# Patient Record
Sex: FEMALE | Race: WHITE | NOT HISPANIC OR LATINO | Employment: OTHER
[De-identification: names, ages, dates, MRNs, and addresses within clinical notes are randomized per-mention and may not be internally consistent; named-entity substitution may affect disease eponyms.]

---

## 2018-11-07 ENCOUNTER — TRANSCRIBE ORDERS (OUTPATIENT)
Dept: SCHEDULING | Age: 68
End: 2018-11-07

## 2018-11-07 DIAGNOSIS — M48.061 SPINAL STENOSIS OF LUMBAR REGION WITHOUT NEUROGENIC CLAUDICATION: Primary | ICD-10-CM

## 2018-11-07 DIAGNOSIS — M54.17 RADICULOPATHY OF LUMBOSACRAL REGION: ICD-10-CM

## 2018-11-20 ENCOUNTER — HOSPITAL ENCOUNTER (OUTPATIENT)
Dept: RADIOLOGY | Age: 68
Discharge: HOME | End: 2018-11-20
Attending: PHYSICAL MEDICINE & REHABILITATION
Payer: MEDICARE

## 2018-11-20 DIAGNOSIS — M48.061 SPINAL STENOSIS OF LUMBAR REGION WITHOUT NEUROGENIC CLAUDICATION: ICD-10-CM

## 2018-11-20 DIAGNOSIS — M54.17 RADICULOPATHY OF LUMBOSACRAL REGION: ICD-10-CM

## 2020-12-01 ENCOUNTER — OFFICE VISIT (OUTPATIENT)
Dept: INTERNAL MEDICINE | Facility: CLINIC | Age: 70
End: 2020-12-01
Payer: MEDICARE

## 2020-12-01 VITALS
DIASTOLIC BLOOD PRESSURE: 78 MMHG | SYSTOLIC BLOOD PRESSURE: 134 MMHG | HEIGHT: 61 IN | RESPIRATION RATE: 16 BRPM | OXYGEN SATURATION: 99 % | BODY MASS INDEX: 21.9 KG/M2 | TEMPERATURE: 97.5 F | HEART RATE: 89 BPM | WEIGHT: 116 LBS

## 2020-12-01 DIAGNOSIS — E11.9 TYPE 2 DIABETES MELLITUS WITHOUT COMPLICATION, WITHOUT LONG-TERM CURRENT USE OF INSULIN (CMS/HCC): Primary | ICD-10-CM

## 2020-12-01 DIAGNOSIS — H10.13 ALLERGIC CONJUNCTIVITIS OF BOTH EYES: ICD-10-CM

## 2020-12-01 DIAGNOSIS — Z86.0100 HX OF COLONIC POLYPS: ICD-10-CM

## 2020-12-01 DIAGNOSIS — Z11.59 SCREENING FOR VIRAL DISEASE: ICD-10-CM

## 2020-12-01 DIAGNOSIS — E78.5 HYPERLIPIDEMIA, UNSPECIFIED HYPERLIPIDEMIA TYPE: ICD-10-CM

## 2020-12-01 DIAGNOSIS — Z12.31 ENCOUNTER FOR SCREENING MAMMOGRAM FOR MALIGNANT NEOPLASM OF BREAST: ICD-10-CM

## 2020-12-01 DIAGNOSIS — E55.9 VITAMIN D DEFICIENCY: ICD-10-CM

## 2020-12-01 PROBLEM — Z90.89 HX OF TONSILLECTOMY: Status: ACTIVE | Noted: 2020-12-01

## 2020-12-01 PROBLEM — J30.2 SEASONAL ALLERGIES: Status: ACTIVE | Noted: 2020-12-01

## 2020-12-01 PROBLEM — M17.11 OSTEOARTHRITIS OF RIGHT KNEE: Status: ACTIVE | Noted: 2020-12-01

## 2020-12-01 PROBLEM — Z90.711 HISTORY OF PARTIAL HYSTERECTOMY: Status: ACTIVE | Noted: 2020-12-01

## 2020-12-01 PROBLEM — Z90.49 HX OF APPENDECTOMY: Status: ACTIVE | Noted: 2020-12-01

## 2020-12-01 PROCEDURE — 99204 OFFICE O/P NEW MOD 45 MIN: CPT | Performed by: INTERNAL MEDICINE

## 2020-12-01 RX ORDER — OLOPATADINE HYDROCHLORIDE 1 MG/ML
1 SOLUTION/ DROPS OPHTHALMIC 2 TIMES DAILY
Qty: 5 ML | Refills: 5 | Status: SHIPPED | OUTPATIENT
Start: 2020-12-01 | End: 2021-04-13

## 2020-12-01 RX ORDER — METFORMIN HYDROCHLORIDE 500 MG/1
500 TABLET, EXTENDED RELEASE ORAL
Qty: 90 TABLET | Refills: 1 | Status: SHIPPED | OUTPATIENT
Start: 2020-12-01 | End: 2021-02-23 | Stop reason: SDUPTHER

## 2020-12-01 RX ORDER — DICLOFENAC SODIUM 10 MG/G
GEL TOPICAL AS NEEDED
COMMUNITY

## 2020-12-01 RX ORDER — GLIMEPIRIDE 1 MG/1
1 TABLET ORAL DAILY
COMMUNITY
Start: 2020-09-30 | End: 2020-12-01

## 2020-12-01 ASSESSMENT — ENCOUNTER SYMPTOMS
CHILLS: 0
SHORTNESS OF BREATH: 0
HEMATURIA: 0
COUGH: 0
ABDOMINAL PAIN: 0
HEADACHES: 0
SINUS PRESSURE: 0
BACK PAIN: 0
BLOOD IN STOOL: 0
ADENOPATHY: 0
EYE PAIN: 0
SORE THROAT: 0
UNEXPECTED WEIGHT CHANGE: 0
MYALGIAS: 0
NERVOUS/ANXIOUS: 0
CONSTIPATION: 0
PALPITATIONS: 0
POLYDIPSIA: 0
WOUND: 0
DIARRHEA: 0
NAUSEA: 0
EYE ITCHING: 1
WHEEZING: 0
NECK PAIN: 0
RHINORRHEA: 0
DIZZINESS: 0
CONFUSION: 0
DYSURIA: 0
FEVER: 0
VOMITING: 0
FATIGUE: 0
CHEST TIGHTNESS: 0
SLEEP DISTURBANCE: 0
FREQUENCY: 0
WEAKNESS: 0

## 2020-12-01 NOTE — PROGRESS NOTES
Subjective      Patient ID: Whitney Ordaz is a 70 y.o. female.    Here to [facility]. Hx reviewed. She is feeling well in gen. Walks and bikes for exercise. No chest symptoms. Weight stable. seh is concenred about her dm12. She was on glimeperizde but stopped 12 days ago sec to mouth swellling. Her am fasting bs are now high 100s low 200s. She was on metformin for yrs but stopped because concerned about soemthing she read about a cancer link. Also itchy watery eye. otc drops not helping.       The following have been reviewed and updated as appropriate in this visit:       Review of Systems   Constitutional: Negative for chills, fatigue, fever and unexpected weight change.   HENT: Negative for congestion, ear pain, hearing loss, rhinorrhea, sinus pressure and sore throat.    Eyes: Positive for itching. Negative for pain and visual disturbance.   Respiratory: Negative for cough, chest tightness, shortness of breath and wheezing.    Cardiovascular: Negative for chest pain, palpitations and leg swelling.   Gastrointestinal: Negative for abdominal pain, blood in stool, constipation, diarrhea, nausea and vomiting.   Endocrine: Negative for cold intolerance, heat intolerance and polydipsia.   Genitourinary: Negative for dysuria, frequency, hematuria and urgency.   Musculoskeletal: Negative for back pain, myalgias and neck pain.   Skin: Negative for rash and wound.   Allergic/Immunologic: Negative for environmental allergies and food allergies.   Neurological: Negative for dizziness, syncope, weakness and headaches.   Hematological: Negative for adenopathy.   Psychiatric/Behavioral: Negative for confusion and sleep disturbance. The patient is not nervous/anxious.          Current Outpatient Medications:   •  diclofenac sodium (VOLTAREN ARTHRITIS PAIN) 1 % topical gel, Apply topically as needed for mild pain., Disp: , Rfl:   •  metFORMIN XR (GLUCOPHAGE XR) 500 mg 24 hr tablet, Take 1 tablet (500 mg total) by mouth  "daily with breakfast., Disp: 90 tablet, Rfl: 1  •  olopatadine (PatanoL) 0.1 % ophthalmic solution, Administer 1 drop into both eyes 2 (two) times a day., Disp: 5 mL, Rfl: 5    Allergies:  Atorvastatin, Rosuvastatin calcium, Sitagliptin, Lidocaine hcl, Glimepiride, Penicillins, Pioglitazone, and Procaine    Past Medical History:   Diagnosis Date   • Allergies    • Arthritis    • Diabetes (CMS/[facility])    • DM2 (diabetes mellitus, type 2) (CMS/[facility]) 12/1/2020   • Hemorrhoids    • History of partial hysterectomy 12/1/2020   • Hx of appendectomy 12/1/2020   • Hx of colonic polyps 12/1/2020   • Hx of tonsillectomy 12/1/2020   • Hyperlipidemia 12/1/2020   • Lipid disorder    • Osteoarthritis of right knee 12/1/2020   • Seasonal allergies 12/1/2020       Past Surgical History:   Procedure Laterality Date   • APPENDECTOMY     • HYSTERECTOMY     • TONSILLECTOMY         Social History     Tobacco Use   • Smoking status: Never Smoker   • Smokeless tobacco: Never Used   Substance Use Topics   • Alcohol use: Not Currently   • Drug use: Never       Objective     Vitals:    12/01/20 1437   BP: 134/78   Pulse: 89   Resp: 16   Temp: 36.4 °C (97.5 °F)   TempSrc: Temporal   SpO2: 99%   Weight: 52.6 kg (116 lb)   Height: 1.549 m (5' 1\")       Physical Exam  Constitutional:       Appearance: She is well-developed.   HENT:      Head: Normocephalic and atraumatic.      Right Ear: External ear normal.      Left Ear: External ear normal.      Nose: Nose normal.   Eyes:      Conjunctiva/sclera: Conjunctivae normal.      Pupils: Pupils are equal, round, and reactive to light.   Neck:      Musculoskeletal: Neck supple.   Cardiovascular:      Rate and Rhythm: Normal rate.   Pulmonary:      Effort: Pulmonary effort is normal.   Abdominal:      Palpations: Abdomen is soft.   Musculoskeletal:         General: No deformity.   Skin:     General: Skin is warm and dry.      Findings: No rash.   Neurological:      Mental Status: She is alert and oriented " to person, place, and time.   Psychiatric:         Mood and Affect: Mood is not depressed.         Behavior: Behavior normal.         Thought Content: Thought content normal.         Assessment/Plan   Type 2 diabetes mellitus without complication, without long-term current use of insulin (CMS/[facility])  (primary encounter diagnosis)  Comment: restart metformin, labs in 3-4 months  Plan: BI SCREENING MAMMOGRAM BILATERAL, Hemoglobin         A1c, Microalbumin/Creatinine Ur Random    Allergic conjunctivitis of both eyes  Comment: rx trial of patanolol drops    Hyperlipidemia, unspecified hyperlipidemia type  Comment: very high but her CT brendan score earlier thsi yr was only 18; she cannot tolerate statins  Plan: CBC and Differential, Lipid panel,         Comprehensive metabolic panel, TSH 3rd         Generation    Hx of colonic polyps  Comment: colo 2014; rec 5 yr f/u so she is overdue; she will call dr navarrete to sched this    Encounter for screening mammogram for malignant neoplasm of breast   Plan: BI SCREENING MAMMOGRAM BILATERAL    Vitamin D deficiency  Plan: Vitamin D 25 hydroxy    Screening for viral disease  Plan: Hepatitis C antibody    HM  Fasting lab slip; f/u 6 month for medicare prev exam            Porfirio Soto MD    12/1/2020

## 2021-02-23 RX ORDER — METFORMIN HYDROCHLORIDE 500 MG/1
500 TABLET, EXTENDED RELEASE ORAL
Qty: 90 TABLET | Refills: 1 | Status: SHIPPED | OUTPATIENT
Start: 2021-02-23 | End: 2021-05-27 | Stop reason: SDUPTHER

## 2021-03-22 ENCOUNTER — TELEPHONE (OUTPATIENT)
Dept: INTERNAL MEDICINE | Facility: CLINIC | Age: 71
End: 2021-03-22

## 2021-03-22 NOTE — TELEPHONE ENCOUNTER
Reviewed labs. They are stable. dm2 controlled. I will see her in June. labs done at [facility], see scanned docs.

## 2021-04-13 ENCOUNTER — OFFICE VISIT (OUTPATIENT)
Dept: INTERNAL MEDICINE | Facility: CLINIC | Age: 71
End: 2021-04-13
Payer: MEDICARE

## 2021-04-13 VITALS
WEIGHT: 110 LBS | DIASTOLIC BLOOD PRESSURE: 70 MMHG | OXYGEN SATURATION: 99 % | HEART RATE: 95 BPM | BODY MASS INDEX: 20.77 KG/M2 | SYSTOLIC BLOOD PRESSURE: 132 MMHG | HEIGHT: 61 IN | TEMPERATURE: 97.5 F | RESPIRATION RATE: 16 BRPM

## 2021-04-13 DIAGNOSIS — H69.91 DYSFUNCTION OF RIGHT EUSTACHIAN TUBE: ICD-10-CM

## 2021-04-13 DIAGNOSIS — Z23 ENCOUNTER FOR IMMUNIZATION: ICD-10-CM

## 2021-04-13 DIAGNOSIS — H93.13 RINGING IN EAR, BILATERAL: Primary | ICD-10-CM

## 2021-04-13 PROCEDURE — 99213 OFFICE O/P EST LOW 20 MIN: CPT | Performed by: INTERNAL MEDICINE

## 2021-04-13 ASSESSMENT — ENCOUNTER SYMPTOMS
FREQUENCY: 0
FEVER: 0
WOUND: 0
NECK PAIN: 0
NERVOUS/ANXIOUS: 0
DIARRHEA: 0
BACK PAIN: 0
MYALGIAS: 0
CONFUSION: 0
CHEST TIGHTNESS: 0
CHILLS: 0
NAUSEA: 0
UNEXPECTED WEIGHT CHANGE: 0
HEMATURIA: 0
DIZZINESS: 0
POLYDIPSIA: 0
PALPITATIONS: 0
HEADACHES: 0
SHORTNESS OF BREATH: 0
SLEEP DISTURBANCE: 0
ABDOMINAL PAIN: 0
SINUS PRESSURE: 1
CONSTIPATION: 0
EYE PAIN: 0
WHEEZING: 0
BLOOD IN STOOL: 0
FATIGUE: 0
WEAKNESS: 0
RHINORRHEA: 0
COUGH: 0
ADENOPATHY: 0
SORE THROAT: 0
VOMITING: 0
DYSURIA: 0

## 2021-04-13 NOTE — PROGRESS NOTES
"Subjective      Patient ID: Whitney Ordaz is a 70 y.o. female.    Pt c/o ringing and \"swishing\" sounds in both ears on/off. Bene going on for yrs but more pronounced the last month. Now with occ pain and pressure in right ear. No d/c. Hearing doesn't seem muffled., no fevers. She does feels congested. She has allergies. Not taking any allergy meds.       The following have been reviewed and updated as appropriate in this visit:  Allergies  Meds  Problems       Review of Systems   Constitutional: Negative for chills, fatigue, fever and unexpected weight change.   HENT: Positive for ear pain and sinus pressure. Negative for congestion, hearing loss, rhinorrhea and sore throat.    Eyes: Negative for pain and visual disturbance.   Respiratory: Negative for cough, chest tightness, shortness of breath and wheezing.    Cardiovascular: Negative for chest pain, palpitations and leg swelling.   Gastrointestinal: Negative for abdominal pain, blood in stool, constipation, diarrhea, nausea and vomiting.   Endocrine: Negative for cold intolerance, heat intolerance and polydipsia.   Genitourinary: Negative for dysuria, frequency, hematuria and urgency.   Musculoskeletal: Negative for back pain, myalgias and neck pain.   Skin: Negative for rash and wound.   Allergic/Immunologic: Negative for environmental allergies and food allergies.   Neurological: Negative for dizziness, syncope, weakness and headaches.   Hematological: Negative for adenopathy.   Psychiatric/Behavioral: Negative for confusion and sleep disturbance. The patient is not nervous/anxious.          Current Outpatient Medications:   •  diclofenac sodium (VOLTAREN ARTHRITIS PAIN) 1 % topical gel, Apply topically as needed for mild pain., Disp: , Rfl:   •  metFORMIN XR (GLUCOPHAGE XR) 500 mg 24 hr tablet, Take 1 tablet (500 mg total) by mouth daily with breakfast., Disp: 90 tablet, Rfl: 1    Allergies:  Atorvastatin, Rosuvastatin calcium, Sitagliptin, " "Lidocaine hcl, Glimepiride, Penicillins, Pioglitazone, and Procaine    Past Medical History:   Diagnosis Date   • Allergies    • Arthritis    • Diabetes (CMS/[facility])    • DM2 (diabetes mellitus, type 2) (CMS/[facility]) 12/1/2020   • Hemorrhoids    • History of partial hysterectomy 12/1/2020   • Hx of appendectomy 12/1/2020   • Hx of colonic polyps 12/1/2020   • Hx of tonsillectomy 12/1/2020   • Hyperlipidemia 12/1/2020   • Lipid disorder    • Osteoarthritis of right knee 12/1/2020   • Seasonal allergies 12/1/2020       Past Surgical History:   Procedure Laterality Date   • APPENDECTOMY     • HYSTERECTOMY     • TONSILLECTOMY         Social History     Tobacco Use   • Smoking status: Never Smoker   • Smokeless tobacco: Never Used   Vaping Use   • Vaping Use: Never used   Substance Use Topics   • Alcohol use: Not Currently   • Drug use: Never       Objective     Vitals:    04/13/21 1127   BP: 132/70   Pulse: 95   Resp: 16   Temp: 36.4 °C (97.5 °F)   TempSrc: Temporal   SpO2: 99%   Weight: 49.9 kg (110 lb)   Height: 1.549 m (5' 1\")       Physical Exam  Constitutional:       Appearance: She is well-developed.   HENT:      Head: Normocephalic and atraumatic.      Right Ear: Tympanic membrane, ear canal and external ear normal.      Left Ear: Tympanic membrane, ear canal and external ear normal.      Nose: Nose normal.   Eyes:      Conjunctiva/sclera: Conjunctivae normal.      Pupils: Pupils are equal, round, and reactive to light.   Cardiovascular:      Rate and Rhythm: Normal rate.   Pulmonary:      Effort: Pulmonary effort is normal.   Abdominal:      Palpations: Abdomen is soft.   Musculoskeletal:         General: No deformity.      Cervical back: Neck supple.   Skin:     General: Skin is warm and dry.      Findings: No rash.   Neurological:      Mental Status: She is alert and oriented to person, place, and time.   Psychiatric:         Mood and Affect: Mood is not depressed.         Behavior: Behavior normal.         Thought " Content: Thought content normal.         Assessment/Plan   Ringing in ear, bilateral  (primary encounter diagnosis)  Comment: referral to ent to eval these more chronic complaints; fro now start zyrtec, flonase and sudafed  Plan: Ambulatory referral to ENT    Dysfunction of right eustachian tube  Comment: as above          Porfirio Soto MD    4/13/2021

## 2021-04-15 ENCOUNTER — TELEPHONE (OUTPATIENT)
Dept: INTERNAL MEDICINE | Facility: CLINIC | Age: 71
End: 2021-04-15

## 2021-05-05 ENCOUNTER — OFFICE VISIT (OUTPATIENT)
Dept: OTOLARYNGOLOGY | Facility: CLINIC | Age: 71
End: 2021-05-05
Payer: MEDICARE

## 2021-05-05 VITALS
HEART RATE: 97 BPM | HEIGHT: 61 IN | OXYGEN SATURATION: 80 % | TEMPERATURE: 97.4 F | BODY MASS INDEX: 20.77 KG/M2 | WEIGHT: 110 LBS

## 2021-05-05 DIAGNOSIS — R09.82 POST-NASAL DRIP: ICD-10-CM

## 2021-05-05 DIAGNOSIS — H90.3 SENSORINEURAL HEARING LOSS (SNHL) OF BOTH EARS: ICD-10-CM

## 2021-05-05 DIAGNOSIS — J30.9 ALLERGIC RHINITIS, UNSPECIFIED SEASONALITY, UNSPECIFIED TRIGGER: ICD-10-CM

## 2021-05-05 DIAGNOSIS — H93.13 TINNITUS OF BOTH EARS: Primary | ICD-10-CM

## 2021-05-05 PROCEDURE — 99204 OFFICE O/P NEW MOD 45 MIN: CPT | Mod: 25 | Performed by: OTOLARYNGOLOGY

## 2021-05-05 PROCEDURE — 69210 REMOVE IMPACTED EAR WAX UNI: CPT | Mod: RT | Performed by: OTOLARYNGOLOGY

## 2021-05-05 RX ORDER — AZELASTINE 1 MG/ML
1 SPRAY, METERED NASAL 2 TIMES DAILY
Qty: 30 ML | Refills: 5 | Status: SHIPPED | OUTPATIENT
Start: 2021-05-05 | End: 2021-06-02

## 2021-05-05 NOTE — PATIENT INSTRUCTIONS
For the post nasal drip and sinus pressure, no recurrent infections recommend add azelastine to flonase and loratadine for allergies.  I also recommend consider seeing an allergist such as Dr. Villar    For the tinnitus - repeat hearing test yearly.  Noise distractions such as music played at a low level or a fan.

## 2021-05-05 NOTE — PROGRESS NOTES
[facility]  [address], [address]  [place] [address]  Phone: [phone]  Fax: [phone]      Patient ID: Whitney Ordaz                              : 1950    Visit Date: 2021  Referring Provider: Porfirio Soto MD    Chief Complaint: Tinnitus      Whitney Ordaz is a 71 y.o.  female who presents with tinnitus for a second opinion on her tinnitus.  She has had a whooshing sound in both ears for the last several months.  Intermittently she gets a ringing that lasts just a few seconds here and there.  She denies ear pain drainage infections.  She tends to build wax and sometimes removing the wax has helped with the symptoms.  She has used Debrox eardrops and sometimes that improves the ringing.    She was seen in this office in 2018 and had an audiogram that showed bilateral symmetric high-frequency mild to moderate sensorineural hearing loss.  At that time she was advised that she was a borderline candidate for hearing aids.    She recently saw Dr. Bravo at [facility] 6 months ago for the tinnitus.  She had an audiogram that demonstrated mild to severe sensorineural hearing loss and was recommended to consider hearing aids though she was not ready to proceed at that time.  She does not appreciate significant hearing loss but is more worried about the tinnitus.    She also has a history of postnasal drip allergic rhinitis for which she takes loratadine and Flonase.  This helped somewhat but does not fully resolve her postnasal drip.    Review of Systems    Current Medications: has a current medication list which includes the following prescription(s): diclofenac sodium, metformin xr, and azelastine.    Past Medical History:   Past Medical History:   Diagnosis Date   • Allergies    • Arthritis    • Diabetes (CMS/[facility])    • DM2 (diabetes mellitus, type 2) (CMS/[facility]) 2020   • Hemorrhoids    • History of partial hysterectomy 2020   • Hx of appendectomy  "12/1/2020   • Hx of colonic polyps 12/1/2020   • Hx of tonsillectomy 12/1/2020   • Hyperlipidemia 12/1/2020   • Lipid disorder    • Osteoarthritis of right knee 12/1/2020   • Seasonal allergies 12/1/2020       Past Surgical History:   Past Surgical History:   Procedure Laterality Date   • APPENDECTOMY     • HYSTERECTOMY     • TONSILLECTOMY         Social History:  reports that she has never smoked. She has never used smokeless tobacco. She reports previous alcohol use. She reports that she does not use drugs.    Family History: History reviewed. No pertinent family history.        Allergies: Atorvastatin, Rosuvastatin calcium, Sitagliptin, Lidocaine hcl, Glimepiride, Penicillins, Pioglitazone, and Procaine    Physical Exam:  Vitals:   Visit Vitals  Pulse 97   Temp 36.3 °C (97.4 °F) (Temporal)   Ht 1.549 m (5' 1\")   Wt 49.9 kg (110 lb)   SpO2 (!) 80%   BMI 20.78 kg/m²     General:  Well-developed, well-nourished 71 y.o. femalein no acute distress.  Voice: Normal without hoarseness, breathiness or stridor.  Head/face:  No scars or lesions.  No parotid masses. No presinus tenderness. Symmetric, normal facies.  Eyes:  Extraocular movements and gaze alignment normal.  Ears: Auricles normal.  External auditory canals free of cerumen on the left obstructed with cerumen on the right.  Tympanic membranes clear, mobile and without retraction or scar.  Nose:  Dorsum straight.  Septum midline.  Turbinates mildly edematous.  No pus, polyps or crusts.  Oral Cavity/oropharynx:  Normal tongue thrust. Normal gag reflex. No masses, leukoplakia, erythroplakia, ulcers or other abnormalities at the tongue, floor of mouth, buccal mucosa, palate or posterior pharyngeal wall.     Neck:  No masses, adenopathy, cervical spasm or thyroid enlargement.  Cranial Nerves II through XII: Grossly intact.  Lungs: equal chest rise  Heart: Regular rate and rhythm.  Mental status: Awake, alert and oriented ×3.      Cerumen Removal-   The right ear was " examined under the otomicroscope and noted to have cerumen impaction obstruting adequate visualization of the tympanic membrane.  Using a combination of suction, curettes, alligator and micro instruments, the cerumen was successfully extracted.  At the conclusion of the procedure the EAC was noted to be healthy and intact without evidence of bleeding or trama. The tympanic membrane appeared intact, with normal landmarks and no evidence of effusion        Impression:  71 y.o.  female with Tinnitus of both ears  (primary encounter diagnosis)    Sensorineural hearing loss (SNHL) of both ears    Post-nasal drip    Allergic rhinitis, unspecified seasonality, unspecified trigger    For the post nasal drip and sinus pressure, no recurrent infections recommend add azelastine to flonase and loratadine for allergies.  I also recommend consider seeing an allergist such as Dr. Villar    For the tinnitus - repeat hearing test yearly.  Noise distractions such as music played at a low level or a fan.       Problem List Items Addressed This Visit     None      Visit Diagnoses     Tinnitus of both ears    -  Primary    Sensorineural hearing loss (SNHL) of both ears        Post-nasal drip        Allergic rhinitis, unspecified seasonality, unspecified trigger              Skye Graham MD

## 2021-05-27 RX ORDER — METFORMIN HYDROCHLORIDE 500 MG/1
500 TABLET, EXTENDED RELEASE ORAL
Qty: 90 TABLET | Refills: 1 | Status: SHIPPED | OUTPATIENT
Start: 2021-05-27 | End: 2022-05-05

## 2021-06-02 ENCOUNTER — OFFICE VISIT (OUTPATIENT)
Dept: INTERNAL MEDICINE | Facility: CLINIC | Age: 71
End: 2021-06-02
Payer: MEDICARE

## 2021-06-02 VITALS
HEIGHT: 61 IN | HEART RATE: 102 BPM | TEMPERATURE: 96.6 F | SYSTOLIC BLOOD PRESSURE: 142 MMHG | BODY MASS INDEX: 20.2 KG/M2 | RESPIRATION RATE: 15 BRPM | WEIGHT: 107 LBS | DIASTOLIC BLOOD PRESSURE: 72 MMHG | OXYGEN SATURATION: 98 %

## 2021-06-02 DIAGNOSIS — L30.9 DERMATITIS: Primary | ICD-10-CM

## 2021-06-02 PROCEDURE — 99213 OFFICE O/P EST LOW 20 MIN: CPT | Performed by: INTERNAL MEDICINE

## 2021-06-02 ASSESSMENT — ENCOUNTER SYMPTOMS
ABDOMINAL PAIN: 0
RHINORRHEA: 0
EYE PAIN: 0
CONSTIPATION: 0
VOMITING: 0
HEMATURIA: 0
NAUSEA: 0
DYSURIA: 0
WOUND: 0
UNEXPECTED WEIGHT CHANGE: 0
WEAKNESS: 0
BLOOD IN STOOL: 0
HEADACHES: 0
POLYDIPSIA: 0
FREQUENCY: 0
SLEEP DISTURBANCE: 0
COUGH: 0
FATIGUE: 0
DIARRHEA: 0
CONFUSION: 0
MYALGIAS: 0
ADENOPATHY: 0
SHORTNESS OF BREATH: 0
FEVER: 0
BACK PAIN: 0
CHEST TIGHTNESS: 0
PALPITATIONS: 0
NECK PAIN: 0
NERVOUS/ANXIOUS: 0
DIZZINESS: 0
CHILLS: 0
WHEEZING: 0
SINUS PRESSURE: 0
SORE THROAT: 0

## 2021-06-02 NOTE — PROGRESS NOTES
Subjective      Patient ID: Whitney Ordaz is a 71 y.o. female.    Pt c/o rash. Red nontnder spots on left abd. Only 1 is a bit itchy. Started 1 week ago. Her daughter dog jumped on her lap arpudn that time. She is allergic to dogs. Nothing else new. She is otherwise well.       The following have been reviewed and updated as appropriate in this visit:  Allergies  Meds  Problems       Review of Systems   Constitutional: Negative for chills, fatigue, fever and unexpected weight change.   HENT: Negative for congestion, ear pain, hearing loss, rhinorrhea, sinus pressure and sore throat.    Eyes: Negative for pain and visual disturbance.   Respiratory: Negative for cough, chest tightness, shortness of breath and wheezing.    Cardiovascular: Negative for chest pain, palpitations and leg swelling.   Gastrointestinal: Negative for abdominal pain, blood in stool, constipation, diarrhea, nausea and vomiting.   Endocrine: Negative for cold intolerance, heat intolerance and polydipsia.   Genitourinary: Negative for dysuria, frequency, hematuria and urgency.   Musculoskeletal: Negative for back pain, myalgias and neck pain.   Skin: Positive for rash. Negative for wound.   Allergic/Immunologic: Negative for environmental allergies and food allergies.   Neurological: Negative for dizziness, syncope, weakness and headaches.   Hematological: Negative for adenopathy.   Psychiatric/Behavioral: Negative for confusion and sleep disturbance. The patient is not nervous/anxious.          Current Outpatient Medications:   •  diclofenac sodium (VOLTAREN ARTHRITIS PAIN) 1 % topical gel, Apply topically as needed for mild pain., Disp: , Rfl:   •  metFORMIN XR (GLUCOPHAGE XR) 500 mg 24 hr tablet, Take 1 tablet (500 mg total) by mouth daily with breakfast., Disp: 90 tablet, Rfl: 1    Allergies:  Atorvastatin, Rosuvastatin calcium, Sitagliptin, Lidocaine hcl, Glimepiride, Penicillins, Pioglitazone, and Procaine    Past Medical  "History:   Diagnosis Date   • Allergies    • Arthritis    • Diabetes (CMS/[facility])    • DM2 (diabetes mellitus, type 2) (CMS/[facility]) 12/1/2020   • Hemorrhoids    • History of partial hysterectomy 12/1/2020   • Hx of appendectomy 12/1/2020   • Hx of colonic polyps 12/1/2020   • Hx of tonsillectomy 12/1/2020   • Hyperlipidemia 12/1/2020   • Lipid disorder    • Osteoarthritis of right knee 12/1/2020   • Seasonal allergies 12/1/2020       Past Surgical History:   Procedure Laterality Date   • APPENDECTOMY     • HYSTERECTOMY     • TONSILLECTOMY         Social History     Tobacco Use   • Smoking status: Never Smoker   • Smokeless tobacco: Never Used   Vaping Use   • Vaping Use: Never used   Substance Use Topics   • Alcohol use: Not Currently   • Drug use: Never       Objective     Vitals:    06/02/21 1059   BP: (!) 142/72   Pulse: (!) 102   Resp: 15   Temp: (!) 35.9 °C (96.6 °F)   TempSrc: Temporal   SpO2: 98%   Weight: 48.5 kg (107 lb)   Height: 1.549 m (5' 1\")       Physical Exam  Constitutional:       Appearance: She is well-developed.   HENT:      Head: Normocephalic and atraumatic.      Right Ear: External ear normal.      Left Ear: External ear normal.      Nose: Nose normal.   Eyes:      Conjunctiva/sclera: Conjunctivae normal.      Pupils: Pupils are equal, round, and reactive to light.   Cardiovascular:      Rate and Rhythm: Normal rate.      Heart sounds: Normal heart sounds.   Pulmonary:      Effort: Pulmonary effort is normal.   Abdominal:      Palpations: Abdomen is soft.   Musculoskeletal:         General: No deformity.      Cervical back: Neck supple.   Skin:     General: Skin is warm and dry.      Comments: Left abd: there is round and linear red slightly raises small rashes, nontender, no vessicles   Neurological:      Mental Status: She is alert and oriented to person, place, and time.   Psychiatric:         Mood and Affect: Mood is not depressed.         Behavior: Behavior normal.         Thought Content: " Thought content normal.         Assessment/Plan   Dermatitis  (primary encounter diagnosis)  Comment: likely contact derm related to the dog; advise hydrocortisone cr; f/u prn          Porfirio Soto MD    6/2/2021

## 2021-06-10 ENCOUNTER — APPOINTMENT (OUTPATIENT)
Dept: LAB | Facility: HOSPITAL | Age: 71
End: 2021-06-10
Attending: INTERNAL MEDICINE
Payer: MEDICARE

## 2021-06-10 DIAGNOSIS — E78.5 HYPERLIPIDEMIA, UNSPECIFIED HYPERLIPIDEMIA TYPE: ICD-10-CM

## 2021-06-10 DIAGNOSIS — Z11.59 SCREENING FOR VIRAL DISEASE: ICD-10-CM

## 2021-06-10 DIAGNOSIS — E55.9 VITAMIN D DEFICIENCY: ICD-10-CM

## 2021-06-10 DIAGNOSIS — E11.9 TYPE 2 DIABETES MELLITUS WITHOUT COMPLICATION, WITHOUT LONG-TERM CURRENT USE OF INSULIN (CMS/HCC): ICD-10-CM

## 2021-06-10 LAB
25(OH)D3 SERPL-MCNC: 23 NG/ML (ref 30–100)
ALBUMIN SERPL-MCNC: 4.2 G/DL (ref 3.4–5)
ALBUMIN/CREAT UR: 3.4 UG/MG
ALP SERPL-CCNC: 63 IU/L (ref 35–126)
ALT SERPL-CCNC: 16 IU/L (ref 11–54)
ANION GAP SERPL CALC-SCNC: 14 MEQ/L (ref 3–15)
AST SERPL-CCNC: 16 IU/L (ref 15–41)
BASOPHILS # BLD: 0.06 K/UL (ref 0.01–0.1)
BASOPHILS NFR BLD: 0.7 %
BILIRUB SERPL-MCNC: 0.7 MG/DL (ref 0.3–1.2)
BUN SERPL-MCNC: 20 MG/DL (ref 8–20)
CALCIUM SERPL-MCNC: 9.7 MG/DL (ref 8.9–10.3)
CHLORIDE SERPL-SCNC: 100 MEQ/L (ref 98–109)
CHOLEST SERPL-MCNC: 292 MG/DL
CO2 SERPL-SCNC: 25 MEQ/L (ref 22–32)
CREAT SERPL-MCNC: 0.8 MG/DL (ref 0.6–1.1)
CREAT UR-MCNC: 87.9 MG/DL
DIFFERENTIAL METHOD BLD: NORMAL
EOSINOPHIL # BLD: 0.19 K/UL (ref 0.04–0.36)
EOSINOPHIL NFR BLD: 2.2 %
ERYTHROCYTE [DISTWIDTH] IN BLOOD BY AUTOMATED COUNT: 12.6 % (ref 11.7–14.4)
EST. AVERAGE GLUCOSE BLD GHB EST-MCNC: 151 MG/DL
GFR SERPL CREATININE-BSD FRML MDRD: >60 ML/MIN/1.73M*2
GLUCOSE SERPL-MCNC: 169 MG/DL (ref 70–99)
HBA1C MFR BLD HPLC: 6.9 %
HCT VFR BLDCO AUTO: 42.5 % (ref 35–45)
HDLC SERPL-MCNC: 48 MG/DL
HDLC SERPL: 6.1 {RATIO}
HGB BLD-MCNC: 13.8 G/DL (ref 11.8–15.7)
IMM GRANULOCYTES # BLD AUTO: 0.02 K/UL (ref 0–0.08)
IMM GRANULOCYTES NFR BLD AUTO: 0.2 %
LDLC SERPL CALC-MCNC: 175 MG/DL
LYMPHOCYTES # BLD: 3.38 K/UL (ref 1.2–3.5)
LYMPHOCYTES NFR BLD: 39.4 %
MCH RBC QN AUTO: 29.5 PG (ref 28–33.2)
MCHC RBC AUTO-ENTMCNC: 32.5 G/DL (ref 32.2–35.5)
MCV RBC AUTO: 90.8 FL (ref 83–98)
MICROALBUMIN UR-MCNC: 3 MG/L
MONOCYTES # BLD: 0.76 K/UL (ref 0.28–0.8)
MONOCYTES NFR BLD: 8.9 %
NEUTROPHILS # BLD: 4.17 K/UL (ref 1.7–7)
NEUTS SEG NFR BLD: 48.6 %
NONHDLC SERPL-MCNC: 244 MG/DL
NRBC BLD-RTO: 0 %
PDW BLD AUTO: 9.8 FL (ref 9.4–12.3)
PLATELET # BLD AUTO: 289 K/UL (ref 150–369)
POTASSIUM SERPL-SCNC: 4.4 MEQ/L (ref 3.6–5.1)
PROT SERPL-MCNC: 6.5 G/DL (ref 6–8.2)
RBC # BLD AUTO: 4.68 M/UL (ref 3.93–5.22)
SODIUM SERPL-SCNC: 139 MEQ/L (ref 136–144)
TRIGL SERPL-MCNC: 345 MG/DL (ref 30–149)
TSH SERPL DL<=0.05 MIU/L-ACNC: 1.2 MIU/L (ref 0.34–5.6)
WBC # BLD AUTO: 8.58 K/UL (ref 3.8–10.5)

## 2021-06-10 PROCEDURE — 36415 COLL VENOUS BLD VENIPUNCTURE: CPT

## 2021-06-10 PROCEDURE — 85025 COMPLETE CBC W/AUTO DIFF WBC: CPT

## 2021-06-10 PROCEDURE — 80053 COMPREHEN METABOLIC PANEL: CPT

## 2021-06-10 PROCEDURE — 80061 LIPID PANEL: CPT

## 2021-06-10 PROCEDURE — 82306 VITAMIN D 25 HYDROXY: CPT

## 2021-06-10 PROCEDURE — 82043 UR ALBUMIN QUANTITATIVE: CPT

## 2021-06-10 PROCEDURE — 86803 HEPATITIS C AB TEST: CPT

## 2021-06-10 PROCEDURE — 83036 HEMOGLOBIN GLYCOSYLATED A1C: CPT

## 2021-06-10 PROCEDURE — 84443 ASSAY THYROID STIM HORMONE: CPT

## 2021-06-10 PROCEDURE — 82570 ASSAY OF URINE CREATININE: CPT

## 2021-06-11 LAB — HCV AB SER QL: NONREACTIVE

## 2021-06-14 ENCOUNTER — OFFICE VISIT (OUTPATIENT)
Dept: INTERNAL MEDICINE | Facility: CLINIC | Age: 71
End: 2021-06-14
Payer: MEDICARE

## 2021-06-14 VITALS
OXYGEN SATURATION: 99 % | BODY MASS INDEX: 20.5 KG/M2 | HEART RATE: 75 BPM | RESPIRATION RATE: 16 BRPM | DIASTOLIC BLOOD PRESSURE: 74 MMHG | SYSTOLIC BLOOD PRESSURE: 132 MMHG | WEIGHT: 108.6 LBS | TEMPERATURE: 98 F | HEIGHT: 61 IN

## 2021-06-14 DIAGNOSIS — E78.5 HYPERLIPIDEMIA, UNSPECIFIED HYPERLIPIDEMIA TYPE: ICD-10-CM

## 2021-06-14 DIAGNOSIS — Z86.0100 HX OF COLONIC POLYPS: ICD-10-CM

## 2021-06-14 DIAGNOSIS — E55.9 VITAMIN D DEFICIENCY: ICD-10-CM

## 2021-06-14 DIAGNOSIS — E11.9 TYPE 2 DIABETES MELLITUS WITHOUT COMPLICATION, WITHOUT LONG-TERM CURRENT USE OF INSULIN (CMS/HCC): ICD-10-CM

## 2021-06-14 DIAGNOSIS — Z00.00 PREVENTATIVE HEALTH CARE: Primary | ICD-10-CM

## 2021-06-14 PROCEDURE — G0438 PPPS, INITIAL VISIT: HCPCS | Performed by: INTERNAL MEDICINE

## 2021-06-14 ASSESSMENT — ENCOUNTER SYMPTOMS
BACK PAIN: 0
FREQUENCY: 0
POLYDIPSIA: 0
CHEST TIGHTNESS: 0
CHILLS: 0
UNEXPECTED WEIGHT CHANGE: 0
VOMITING: 0
EYE PAIN: 0
COUGH: 0
WOUND: 0
NERVOUS/ANXIOUS: 0
DIARRHEA: 0
WEAKNESS: 0
FEVER: 0
DIZZINESS: 0
CONFUSION: 0
DYSURIA: 0
HEMATURIA: 0
BLOOD IN STOOL: 0
FATIGUE: 0
CONSTIPATION: 0
ABDOMINAL PAIN: 0
SORE THROAT: 0
NAUSEA: 0
RHINORRHEA: 0
MYALGIAS: 0
NECK PAIN: 0
ADENOPATHY: 0
WHEEZING: 0
SINUS PRESSURE: 0
PALPITATIONS: 0
SHORTNESS OF BREATH: 0
SLEEP DISTURBANCE: 0
HEADACHES: 0

## 2021-06-14 ASSESSMENT — MINI COG
COMPLETED: YES
TOTAL SCORE: 5

## 2021-06-14 ASSESSMENT — PATIENT HEALTH QUESTIONNAIRE - PHQ9: SUM OF ALL RESPONSES TO PHQ9 QUESTIONS 1 & 2: 0

## 2021-06-14 NOTE — PROGRESS NOTES
Subjective      Patient ID: Whitney Ordaz is a 71 y.o. female.    Here for medicare prev exam. Hx reviewd. seh has been well. Rash better. No new concerns. Walks for exercise. No chest symptoms. Weight stable. Compliant with meds.       The following have been reviewed and updated as appropriate in this visit:  Allergies  Meds  Problems       Review of Systems   Constitutional: Negative for chills, fatigue, fever and unexpected weight change.   HENT: Negative for congestion, ear pain, hearing loss, rhinorrhea, sinus pressure and sore throat.    Eyes: Negative for pain and visual disturbance.   Respiratory: Negative for cough, chest tightness, shortness of breath and wheezing.    Cardiovascular: Negative for chest pain, palpitations and leg swelling.   Gastrointestinal: Negative for abdominal pain, blood in stool, constipation, diarrhea, nausea and vomiting.   Endocrine: Negative for cold intolerance, heat intolerance and polydipsia.   Genitourinary: Negative for dysuria, frequency, hematuria and urgency.   Musculoskeletal: Negative for back pain, myalgias and neck pain.   Skin: Negative for rash and wound.   Allergic/Immunologic: Negative for environmental allergies and food allergies.   Neurological: Negative for dizziness, syncope, weakness and headaches.   Hematological: Negative for adenopathy.   Psychiatric/Behavioral: Negative for confusion and sleep disturbance. The patient is not nervous/anxious.          Current Outpatient Medications:   •  diclofenac sodium (VOLTAREN ARTHRITIS PAIN) 1 % topical gel, Apply topically as needed for mild pain., Disp: , Rfl:   •  metFORMIN XR (GLUCOPHAGE XR) 500 mg 24 hr tablet, Take 1 tablet (500 mg total) by mouth daily with breakfast., Disp: 90 tablet, Rfl: 1    Allergies:  Atorvastatin, Rosuvastatin calcium, Sitagliptin, Lidocaine hcl, Glimepiride, Penicillins, Pioglitazone, and Procaine    Past Medical History:   Diagnosis Date   • Allergies    • Arthritis    •  "Diabetes (CMS/[facility])    • DM2 (diabetes mellitus, type 2) (CMS/[facility]) 12/1/2020   • Hemorrhoids    • History of partial hysterectomy 12/1/2020   • Hx of appendectomy 12/1/2020   • Hx of colonic polyps 12/1/2020   • Hx of tonsillectomy 12/1/2020   • Hyperlipidemia 12/1/2020   • Lipid disorder    • Osteoarthritis of right knee 12/1/2020   • Seasonal allergies 12/1/2020   • Vitamin D deficiency 6/14/2021       Past Surgical History:   Procedure Laterality Date   • APPENDECTOMY     • HYSTERECTOMY     • TONSILLECTOMY         Social History     Tobacco Use   • Smoking status: Never Smoker   • Smokeless tobacco: Never Used   Vaping Use   • Vaping Use: Never used   Substance Use Topics   • Alcohol use: Not Currently   • Drug use: Never       Objective     Vitals:    06/14/21 1052   BP: 132/74   Pulse: 75   Resp: 16   Temp: 36.7 °C (98 °F)   TempSrc: Temporal   SpO2: 99%   Weight: 49.3 kg (108 lb 9.6 oz)   Height: 1.549 m (5' 1\")       Physical Exam  Constitutional:       Appearance: She is well-developed.   HENT:      Head: Normocephalic and atraumatic.      Right Ear: External ear normal.      Left Ear: External ear normal.      Nose: Nose normal.   Eyes:      Conjunctiva/sclera: Conjunctivae normal.      Pupils: Pupils are equal, round, and reactive to light.   Neck:      Vascular: No carotid bruit.   Cardiovascular:      Rate and Rhythm: Normal rate and regular rhythm.      Heart sounds: Normal heart sounds. No murmur heard.     Pulmonary:      Effort: Pulmonary effort is normal.      Breath sounds: Normal breath sounds. No wheezing.   Abdominal:      General: Bowel sounds are normal.      Palpations: Abdomen is soft.      Tenderness: There is no abdominal tenderness.   Musculoskeletal:         General: No deformity.      Cervical back: Neck supple.   Skin:     General: Skin is warm and dry.      Findings: No rash.   Neurological:      Mental Status: She is alert and oriented to person, place, and time.   Psychiatric:        "  Mood and Affect: Mood is not depressed.         Behavior: Behavior normal.         Thought Content: Thought content normal.         Assessment/Plan   Preventative health care  (primary encounter diagnosis)  Comment: pt appeasrs well; medicare prev completed; reviewed labs; rec shingrix; mammo done; f/u 6 months    Type 2 diabetes mellitus without complication, without long-term current use of insulin (CMS/[facility])  Comment: controlled; cont metformin, healthy diet adn reg exercise; dm eye exam with optho, dr reyes  Plan: Hemoglobin A1c    Hyperlipidemia, unspecified hyperlipidemia type  Comment: she cant take statins, she will disc alternative with her cardiolgist, dr gonzalez    Hx of colonic polyps  Comment: due for colo, [facility] will call dr navarrete to sched    Vitamin D deficiency  Comment: rec adding 1000iu daily supplemnt          Porfirio Soto MD    6/14/2021

## 2021-06-14 NOTE — PROGRESS NOTES
Subjective     Whitney Ordaz is a 71 y.o. female who presents for an initial annual wellness visit.     Patient Care Team:  Porfirio Soto MD as PCP - General (Internal Medicine)    Comprehensive Medical and Social History  Patient Active Problem List   Diagnosis   • Osteoarthritis of right knee   • DM2 (diabetes mellitus, type 2) (CMS/HCC)   • Hyperlipidemia   • Hx of colonic polyps   • History of partial hysterectomy   • Seasonal allergies   • Hx of appendectomy   • Hx of tonsillectomy   • Vitamin D deficiency     Past Medical History:   Diagnosis Date   • Allergies    • Arthritis    • Diabetes (CMS/HCC)    • DM2 (diabetes mellitus, type 2) (CMS/HCC) 12/1/2020   • Hemorrhoids    • History of partial hysterectomy 12/1/2020   • Hx of appendectomy 12/1/2020   • Hx of colonic polyps 12/1/2020   • Hx of tonsillectomy 12/1/2020   • Hyperlipidemia 12/1/2020   • Lipid disorder    • Osteoarthritis of right knee 12/1/2020   • Seasonal allergies 12/1/2020   • Vitamin D deficiency 6/14/2021     Past Surgical History:   Procedure Laterality Date   • APPENDECTOMY     • HYSTERECTOMY     • TONSILLECTOMY       Allergies   Allergen Reactions   • Atorvastatin      Other reaction(s): Myalgias   Myalgia - 10 mg daily, improved upon discontinuing   • Rosuvastatin Calcium      Other reaction(s): Myalgias   Myalgias - 10 mg daily and on 5 mg twice a week   • Sitagliptin      Other reaction(s): Headache , Myalgias    • Lidocaine Hcl      Other reaction(s): altered heart rate   • Glimepiride      Facial swelling   • Penicillins      Other reaction(s): Altered Heart Rate, Unknown   • Pioglitazone      Other reaction(s): Headache    • Procaine      Other reaction(s): Other  Has tolerated lidocaine topically.     Current Outpatient Medications   Medication Sig Dispense Refill   • diclofenac sodium (VOLTAREN ARTHRITIS PAIN) 1 % topical gel Apply topically as needed for mild pain.     • metFORMIN XR (GLUCOPHAGE XR) 500 mg 24 hr  "tablet Take 1 tablet (500 mg total) by mouth daily with breakfast. 90 tablet 1     No current facility-administered medications for this visit.     Social History     Tobacco Use   • Smoking status: Never Smoker   • Smokeless tobacco: Never Used   Vaping Use   • Vaping Use: Never used   Substance Use Topics   • Alcohol use: Not Currently   • Drug use: Never     No family history on file.    Objective   Vitals  Vitals:    06/14/21 1052   BP: 132/74   Pulse: 75   Resp: 16   Temp: 36.7 °C (98 °F)   TempSrc: Temporal   SpO2: 99%   Weight: 49.3 kg (108 lb 9.6 oz)   Height: 1.549 m (5' 1\")     Body mass index is 20.52 kg/m².    Advanced Care Plan  Does patient have advance directive?: Yes           Does patient have current OOH DNR form?: Yes                         PHQ  Will the patient answer the depression questions?: Yes   Little interest or pleasure in doing things: Not at all   Feeling down, depressed, or hopeless: Not at all   Depression Risk: 0                                               Mini Cog  Completed: Yes  Score: 5  Result: Negative    Get Up and Go       STEADI Falls Risk  One or more falls in the last year: No           Has trouble stepping up onto a curb: No   Advised to use a cane or walker to get around safely: No   Often has to rush to the toilet: No   Feels unsteady when walking: No   Has lost some feeling in feet: No   Often feels sad or depressed: No   Steadies self on furniture while walking at home: No   Takes medication that makes him/her feel lightheaded or more tired than usual: No   Worried about falling: No   Takes medicine to sleep or improve mood: No   Needs to push with hands when rising from a chair: No   Falls screen completed: Yes     Hearing and Vision Screening   Hearing Screening    125Hz 250Hz 500Hz 1000Hz 2000Hz 3000Hz 4000Hz 6000Hz 8000Hz   Right ear:            Left ear:               Visual Acuity Screening    Right eye Left eye Both eyes   Without correction: 20/40 20/40 " 20/40   With correction:        See HRA for relevant hearing screening response.    Assessment/Plan   Diagnoses and all orders for this visit:    Preventative health care (Primary)  Comments:  pt appeasrs well; medicare prev completed    Type 2 diabetes mellitus without complication, without long-term current use of insulin (CMS/[facility])  -     Hemoglobin A1c; Future    Hyperlipidemia, unspecified hyperlipidemia type    Hx of colonic polyps    Vitamin D deficiency        See Patient Instructions (the written plan) which was given to the patient for PPPS and health risk factors with interventions.

## 2021-09-13 ENCOUNTER — TELEPHONE (OUTPATIENT)
Dept: INTERNAL MEDICINE | Facility: CLINIC | Age: 71
End: 2021-09-13

## 2021-09-13 NOTE — TELEPHONE ENCOUNTER
Pt is feeling better and would like to come in to see Dr. Soto tomorrow. Dr. Soto please, task Vale or Gilda back with a time you would like pt added on to your schedule.     Pt notified to go to the ER or UC if symptoms worsen before tomorrow's apt.

## 2021-09-13 NOTE — TELEPHONE ENCOUNTER
Pt c/o of palpitations for a week or so, also notices pulse is rapid, noticing its usually in evening or AM. No headaches or blurred vision, ears do ring off and on. Asking to be seen, where do you want me to put her?

## 2021-09-14 ENCOUNTER — OFFICE VISIT (OUTPATIENT)
Dept: INTERNAL MEDICINE | Facility: CLINIC | Age: 71
End: 2021-09-14
Payer: MEDICARE

## 2021-09-14 VITALS
RESPIRATION RATE: 16 BRPM | OXYGEN SATURATION: 98 % | HEIGHT: 61 IN | TEMPERATURE: 98.4 F | WEIGHT: 106.4 LBS | DIASTOLIC BLOOD PRESSURE: 82 MMHG | BODY MASS INDEX: 20.09 KG/M2 | SYSTOLIC BLOOD PRESSURE: 136 MMHG | HEART RATE: 88 BPM

## 2021-09-14 DIAGNOSIS — R00.2 PALPITATIONS: Primary | ICD-10-CM

## 2021-09-14 PROCEDURE — 99214 OFFICE O/P EST MOD 30 MIN: CPT | Mod: 25 | Performed by: INTERNAL MEDICINE

## 2021-09-14 PROCEDURE — 93000 ELECTROCARDIOGRAM COMPLETE: CPT | Performed by: INTERNAL MEDICINE

## 2021-09-14 PROCEDURE — G0008 ADMIN INFLUENZA VIRUS VAC: HCPCS | Performed by: INTERNAL MEDICINE

## 2021-09-14 PROCEDURE — 90694 VACC AIIV4 NO PRSRV 0.5ML IM: CPT | Performed by: INTERNAL MEDICINE

## 2021-09-14 ASSESSMENT — ENCOUNTER SYMPTOMS
HEADACHES: 0
FEVER: 0
ABDOMINAL PAIN: 0
SINUS PRESSURE: 0
SHORTNESS OF BREATH: 0
CHILLS: 0
ADENOPATHY: 0
WHEEZING: 0
NERVOUS/ANXIOUS: 0
HEMATURIA: 0
FATIGUE: 0
BACK PAIN: 0
COUGH: 0
EYE PAIN: 0
RHINORRHEA: 0
POLYDIPSIA: 0
DIZZINESS: 0
NECK PAIN: 0
SORE THROAT: 0
SLEEP DISTURBANCE: 0
CONSTIPATION: 0
VOMITING: 0
FREQUENCY: 0
MYALGIAS: 0
WEAKNESS: 0
BLOOD IN STOOL: 0
DIARRHEA: 0
PALPITATIONS: 1
CONFUSION: 0
UNEXPECTED WEIGHT CHANGE: 0
WOUND: 0
DYSURIA: 0
CHEST TIGHTNESS: 0
NAUSEA: 0

## 2021-09-14 NOTE — PROGRESS NOTES
Subjective      Patient ID: Whitney Ordaz is a 71 y.o. female.    Pt c/o palpitations on/off for the last week. They are very brief lastinag jusat moment but can recur over an hr or longer. No assoc cp or sob. The trigger for these. She has been feeling well. No etoh, caffeine or nicotine. There has been some stress recently. Her  has bene ill. She is sleeping ok. seh exercisies reg and feels fine.       The following have been reviewed and updated as appropriate in this visit:  Allergies  Meds  Problems       Review of Systems   Constitutional: Negative for chills, fatigue, fever and unexpected weight change.   HENT: Negative for congestion, ear pain, hearing loss, rhinorrhea, sinus pressure and sore throat.    Eyes: Negative for pain and visual disturbance.   Respiratory: Negative for cough, chest tightness, shortness of breath and wheezing.    Cardiovascular: Positive for palpitations. Negative for chest pain and leg swelling.   Gastrointestinal: Negative for abdominal pain, blood in stool, constipation, diarrhea, nausea and vomiting.   Endocrine: Negative for cold intolerance, heat intolerance and polydipsia.   Genitourinary: Negative for dysuria, frequency, hematuria and urgency.   Musculoskeletal: Negative for back pain, myalgias and neck pain.   Skin: Negative for rash and wound.   Allergic/Immunologic: Negative for environmental allergies and food allergies.   Neurological: Negative for dizziness, syncope, weakness and headaches.   Hematological: Negative for adenopathy.   Psychiatric/Behavioral: Negative for confusion and sleep disturbance. The patient is not nervous/anxious.          Current Outpatient Medications:   •  diclofenac sodium (VOLTAREN ARTHRITIS PAIN) 1 % topical gel, Apply topically as needed for mild pain., Disp: , Rfl:   •  metFORMIN XR (GLUCOPHAGE XR) 500 mg 24 hr tablet, Take 1 tablet (500 mg total) by mouth daily with breakfast., Disp: 90 tablet, Rfl:  "1    Allergies:  Atorvastatin, Rosuvastatin calcium, Sitagliptin, Lidocaine hcl, Glimepiride, Penicillins, Pioglitazone, and Procaine    Past Medical History:   Diagnosis Date   • Allergies    • Arthritis    • Diabetes (CMS/[facility])    • DM2 (diabetes mellitus, type 2) (CMS/[facility]) 12/1/2020   • Hemorrhoids    • History of partial hysterectomy 12/1/2020   • Hx of appendectomy 12/1/2020   • Hx of colonic polyps 12/1/2020   • Hx of tonsillectomy 12/1/2020   • Hyperlipidemia 12/1/2020   • Lipid disorder    • Osteoarthritis of right knee 12/1/2020   • Seasonal allergies 12/1/2020   • Vitamin D deficiency 6/14/2021       Past Surgical History:   Procedure Laterality Date   • APPENDECTOMY     • HYSTERECTOMY     • TONSILLECTOMY         Social History     Tobacco Use   • Smoking status: Never Smoker   • Smokeless tobacco: Never Used   Vaping Use   • Vaping Use: Never used   Substance Use Topics   • Alcohol use: Not Currently   • Drug use: Never       Objective     Vitals:    09/14/21 1322   BP: 136/82   Pulse: 88   Resp: 16   Temp: 36.9 °C (98.4 °F)   TempSrc: Temporal   SpO2: 98%   Weight: 48.3 kg (106 lb 6.4 oz)   Height: 1.549 m (5' 1\")       Physical Exam  Constitutional:       Appearance: She is well-developed.   HENT:      Head: Normocephalic and atraumatic.      Right Ear: External ear normal.      Left Ear: External ear normal.      Nose: Nose normal.   Eyes:      Conjunctiva/sclera: Conjunctivae normal.      Pupils: Pupils are equal, round, and reactive to light.   Cardiovascular:      Rate and Rhythm: Normal rate and regular rhythm.      Heart sounds: Normal heart sounds. No murmur heard.     Pulmonary:      Effort: Pulmonary effort is normal.      Breath sounds: Normal breath sounds. No wheezing.   Abdominal:      Palpations: Abdomen is soft.      Tenderness: There is no abdominal tenderness.   Musculoskeletal:         General: No deformity.      Cervical back: Neck supple.   Skin:     General: Skin is warm and dry.     "  Findings: No rash.   Neurological:      Mental Status: She is alert and oriented to person, place, and time.   Psychiatric:         Mood and Affect: Mood is not depressed.         Behavior: Behavior normal.         Thought Content: Thought content normal.         Assessment/Plan   Palpitations  (primary encounter diagnosis)  Comment: pt appears well; nl exam; her ekg is fine; recent labs were fine; I advise a holter monitor and she will call her cardiologist at [place], dr gonzalez; ED prec disc  Plan: ECG 12 LEAD OFFICE PERFORMED    HM  Flu shot        Porfirio Soto MD    9/14/2021

## 2021-09-15 ENCOUNTER — TELEPHONE (OUTPATIENT)
Dept: INTERNAL MEDICINE | Facility: CLINIC | Age: 71
End: 2021-09-15

## 2021-09-15 ENCOUNTER — TELEPHONE (OUTPATIENT)
Dept: SCHEDULING | Facility: CLINIC | Age: 71
End: 2021-09-15

## 2021-09-15 NOTE — TELEPHONE ENCOUNTER
New Patient Appointment Request    Name of caller:  Patient     Insurance: Medicare and [employer] - Verified.     Diagnosis: NP - Palpitations. PCP is requesting Cardiac Eval and Holter Monitor      Referred by:   is a patient of Dr. Gricelda Ordaz.     Previous Cardiologist name and phone number:  Pt stated she sees Dr. Fatima at [place] for Lipid Specialist.     Best contact number: [phone].     Additional notes:  Offered First available with Carrie and Dr. Madison.     Pt is requesting sooner appt if possible.     TY

## 2021-09-15 NOTE — TELEPHONE ENCOUNTER
Pt called because her lipid doctor stated she needs a Holter monitor. Pt asked if  could place the order or ref her to cardiology. Pt can be reached at [phone]. Ty.

## 2021-09-20 PROBLEM — R00.2 HEART PALPITATIONS: Status: ACTIVE | Noted: 2021-09-20

## 2021-09-20 PROBLEM — R00.2 HEART PALPITATIONS: Chronic | Status: ACTIVE | Noted: 2021-09-20

## 2021-09-23 ENCOUNTER — OFFICE VISIT (OUTPATIENT)
Dept: CARDIOLOGY | Facility: CLINIC | Age: 71
End: 2021-09-23
Payer: MEDICARE

## 2021-09-23 VITALS
HEART RATE: 84 BPM | SYSTOLIC BLOOD PRESSURE: 132 MMHG | DIASTOLIC BLOOD PRESSURE: 80 MMHG | OXYGEN SATURATION: 99 % | HEIGHT: 61 IN | BODY MASS INDEX: 19.83 KG/M2 | RESPIRATION RATE: 16 BRPM | WEIGHT: 105 LBS

## 2021-09-23 DIAGNOSIS — E11.9 TYPE 2 DIABETES MELLITUS WITHOUT COMPLICATION, WITHOUT LONG-TERM CURRENT USE OF INSULIN (CMS/HCC): ICD-10-CM

## 2021-09-23 DIAGNOSIS — R00.2 HEART PALPITATIONS: Primary | Chronic | ICD-10-CM

## 2021-09-23 DIAGNOSIS — E78.5 HYPERLIPIDEMIA, UNSPECIFIED HYPERLIPIDEMIA TYPE: ICD-10-CM

## 2021-09-23 PROCEDURE — 99205 OFFICE O/P NEW HI 60 MIN: CPT | Performed by: INTERNAL MEDICINE

## 2021-09-23 PROCEDURE — 93000 ELECTROCARDIOGRAM COMPLETE: CPT | Performed by: INTERNAL MEDICINE

## 2021-09-23 ASSESSMENT — ENCOUNTER SYMPTOMS
DECREASED APPETITE: 1
CONSTIPATION: 1
PALPITATIONS: 1

## 2021-09-23 NOTE — PROGRESS NOTES
Cardiology Consult     Reason for visit: Palpitations    Chief Complaint   Patient presents with   • Palpitations   Minimal elevation of the coronary calcium score  Mixed dyslipidemia  Intolerance to statins     HPI     Whitney Ordaz is a 71 y.o. female who was referred to the office today by her primary care physician regarding palpitations.  Patient's  is also a current patient.    The patient has been experiencing palpitations since she was a child.  However, they have been very quiet for years.  For the last 2 weeks she has noticed very brief palpitations.  They were occurring daily until last week.  They lasted a minute or two.  They would recur over an hour or longer.  She took Tylenol last week and it helped.  She has only had one episode this past week.  She has no associated chest pain or shortness of breath.  She has no lightheadedness or syncope.  She does not drink alcohol, caffeine or nicotine.  She notices no pattern. She had lab studies in June including a TSH that were unremarkable. There has been some stress recently.  Her  is ill.    She exercises without difficulty.  She walks and rides a stationary bike.  She is very active  in her own home.  She participates in physical therapy twice a week for arthritis.  Her appetite has been poor.  She has lost a few pounds.  She is sleeping well.  Her bowels are constipated and she is going to make a follow-up appoint with her gastroenterologist, Dr. Hagan.    She does have a family history of coronary artery disease with both of her parents undergoing bypass surgery in her 70s.  Past Medical History:   Diagnosis Date   • Allergies    • Arthritis    • Diabetes (CMS/[facility])    • DM2 (diabetes mellitus, type 2) (CMS/HCC) 12/1/2020   • Hemorrhoids    • History of partial hysterectomy 12/1/2020   • Hx of appendectomy 12/1/2020   • Hx of colonic polyps 12/1/2020   • Hx of tonsillectomy 12/1/2020   • Hyperlipidemia 12/1/2020   • Lipid  disorder    • Osteoarthritis of right knee 12/1/2020   • Seasonal allergies 12/1/2020   • Vitamin D deficiency 6/14/2021     Past Surgical History:   Procedure Laterality Date   • APPENDECTOMY     • HYSTERECTOMY     • TONSILLECTOMY       Atorvastatin, Rosuvastatin calcium, Sitagliptin, Lidocaine hcl, Glimepiride, Penicillins, Pioglitazone, and Procaine  Current Outpatient Medications   Medication Sig Dispense Refill   • diclofenac sodium (VOLTAREN ARTHRITIS PAIN) 1 % topical gel Apply topically as needed for mild pain.     • metFORMIN XR (GLUCOPHAGE XR) 500 mg 24 hr tablet Take 1 tablet (500 mg total) by mouth daily with breakfast. 90 tablet 1     No current facility-administered medications for this visit.     Social History     Socioeconomic History   • Marital status:      Spouse name: None   • Number of children: None   • Years of education: None   • Highest education level: None   Occupational History   • Occupation: medical social worker   Tobacco Use   • Smoking status: Never Smoker   • Smokeless tobacco: Never Used   Vaping Use   • Vaping Use: Never used   Substance and Sexual Activity   • Alcohol use: Not Currently   • Drug use: Never   • Sexual activity: Yes     Partners: Male   Other Topics Concern   • None   Social History Narrative   • None     Social Determinants of Health     Financial Resource Strain:    • Difficulty of Paying Living Expenses: Not on file   Food Insecurity:    • Worried About Running Out of Food in the Last Year: Not on file   • Ran Out of Food in the Last Year: Not on file   Transportation Needs:    • Lack of Transportation (Medical): Not on file   • Lack of Transportation (Non-Medical): Not on file   Physical Activity:    • Days of Exercise per Week: Not on file   • Minutes of Exercise per Session: Not on file   Stress:    • Feeling of Stress : Not on file   Social Connections:    • Frequency of Communication with Friends and Family: Not on file   • Frequency of Social  Gatherings with Friends and Family: Not on file   • Attends Jain Services: Not on file   • Active Member of Clubs or Organizations: Not on file   • Attends Club or Organization Meetings: Not on file   • Marital Status: Not on file   Intimate Partner Violence:    • Fear of Current or Ex-Partner: Not on file   • Emotionally Abused: Not on file   • Physically Abused: Not on file   • Sexually Abused: Not on file   Housing Stability:    • Unable to Pay for Housing in the Last Year: Not on file   • Number of Places Lived in the Last Year: Not on file   • Unstable Housing in the Last Year: Not on file     History reviewed. No pertinent family history.     Review of Systems   Constitutional: Positive for decreased appetite.   Cardiovascular: Positive for palpitations.   Gastrointestinal: Positive for constipation.   All other systems reviewed and are negative.     Objective   There were no vitals filed for this visit.     Physical Exam  Vitals and nursing note reviewed.   Constitutional:       General: She is not in acute distress.     Appearance: Normal appearance. She is normal weight.   HENT:      Head: Normocephalic and atraumatic.      Right Ear: External ear normal.      Left Ear: External ear normal.      Nose: Nose normal.      Mouth/Throat:      Mouth: Mucous membranes are moist.   Eyes:      General: No scleral icterus.     Conjunctiva/sclera: Conjunctivae normal.      Pupils: Pupils are equal, round, and reactive to light.   Neck:      Vascular: No carotid bruit.   Cardiovascular:      Rate and Rhythm: Normal rate and regular rhythm.      Pulses: Normal pulses.      Heart sounds: No murmur heard.      Pulmonary:      Effort: Pulmonary effort is normal.      Breath sounds: Normal breath sounds.   Abdominal:      General: Abdomen is flat. Bowel sounds are normal.      Palpations: Abdomen is soft.   Musculoskeletal:         General: No swelling. Normal range of motion.      Cervical back: Normal range of motion  and neck supple.   Skin:     General: Skin is warm and dry.      Findings: No rash.   Neurological:      General: No focal deficit present.      Mental Status: She is oriented to person, place, and time.      Deep Tendon Reflexes: Reflexes normal.   Psychiatric:         Mood and Affect: Mood normal.         Behavior: Behavior normal.         Thought Content: Thought content normal.         Judgment: Judgment normal.         Labs   Lab Results   Component Value Date    WBC 8.58 06/10/2021    HGB 13.8 06/10/2021    HCT 42.5 06/10/2021     06/10/2021    CHOL 292 (H) 06/10/2021    TRIG 345 (H) 06/10/2021    HDL 48 (L) 06/10/2021    ALT 16 06/10/2021    AST 16 06/10/2021     06/10/2021    K 4.4 06/10/2021     06/10/2021    CREATININE 0.8 06/10/2021    BUN 20 06/10/2021    CO2 25 06/10/2021    TSH 1.20 06/10/2021    HGBA1C 6.9 (H) 06/10/2021    MICROALBUR 3.0 06/10/2021     EKG      ASSESSMENT/PLAN    Hyperlipidemia    2/11/202 Coronary CT: Calcium score=18.  50-75 percentile.  Intolerant to statins    Patient had a lipid panel drawn on Asha 10, 2021.  Total cholesterol 292, triglycerides 345, HDL 48 with an LDL of 175. She is not interested in a PCSK9 inhibitor.  She adheres to a heart healthy diet. She follows up with Dr. Fatima at [facility].     DM2 (diabetes mellitus, type 2) (CMS/[facility])  The patient had a hemoglobin A1c of 6.9 on Asha 10, 2021.    Heart palpitations  2/12/2020 echo: EF 64%. No RWMA noted.  No gross valvular abnormalities noted.    The patient did take some Tylenol last week which helped with her palpitations.  She has not had an episode since.  She has a normal structural heart based on echocardiography from last year.  She had laboratory studies drawn over the summer including a TSH that were within normal limits.  Therefore we do not believe she needs further testing at this time.  We did tell her to call our office if she has any further episodes.  At that time we could place her on a  long-term monitor.  She is agreeable to this plan.    By signing my name below, I, Carrie CUATELizeth Dye, attest that this documentation has been prepared under the direction and in the presence of Jorge Alberto Madison MD.     CONCHITA Kay  9/23/2021     The patient was seen and examined.  An independent interval history was obtained and physical exam performed.  I agree with the information and findings as documented above.    She has not had any symptoms of palpitations for over a week now.  The previous pattern did not have any identifiable provocative factors that could be identified.  Her physical exam is normal.  She previously has had an extensive a complete cardiac evaluation including coronary artery calcium score, lipid profile, electrocardiogram and resting echocardiography.  The studies were all unrevealing of any concerning abnormality.    In view of the sporadic nature of her symptoms, I do not think a Holter monitor or even a short course of an MCOT would be helpful at this time.  I discussed this impression with her and I believe she understands that the sporadic nature of her symptoms not clearly amenable to current technologies for surveillance other than the consideration of a loop recorder which I do not think is indicated at this time.    No limits placed on her activity.  No other cardiac testing recommended at present.  No new medications advised.  She has been encouraged to call here with any further questions or concerns and if her palpitations should return, we would be happy discuss further evaluation of her complaint at that time.    Jorge Alberto Madison MD

## 2021-09-23 NOTE — LETTER
September 23, 2021     Porfirio Soto MD  [address]  [address]  [place] [address]    Patient: Whitney Ordaz  YOB: 1950  Date of Visit: 9/23/2021      Dear Dr. Soto:    Thank you for referring Whitney Ordaz to me for evaluation. Below are my notes for this consultation.    If you have questions, please do not hesitate to call me. I look forward to following your patient along with you.         Sincerely,        Jorge Alberto Madison MD        CC: MD Hardik Zaidi Theresa V, CRNP  9/23/2021 12:15 PM  Signed      Cardiology Consult     Reason for visit: Palpitations    Chief Complaint   Patient presents with   • Palpitations   Minimal elevation of the coronary calcium score  Mixed dyslipidemia  Intolerance to statins     HPI     Whitney Ordaz is a 71 y.o. female who was referred to the office today by her primary care physician regarding palpitations.  Patient's  is also a current patient.    The patient has been experiencing palpitations since she was a child.  However, they have been very quiet for years.  For the last 2 weeks she has noticed very brief palpitations.  They were occurring daily until last week.  They lasted a minute or two.  They would recur over an hour or longer.  She took Tylenol last week and it helped.  She has only had one episode this past week.  She has no associated chest pain or shortness of breath.  She has no lightheadedness or syncope.  She does not drink alcohol, caffeine or nicotine.  She notices no pattern. She had lab studies in June including a TSH that were unremarkable. There has been some stress recently.  Her  is ill.    She exercises without difficulty.  She walks and rides a stationary bike.  She is very active  in her own home.  She participates in physical therapy twice a week for arthritis.  Her appetite has been poor.  She has lost a few pounds.  She is sleeping well.  Her bowels are constipated and she is  going to make a follow-up appoint with her gastroenterologist, Dr. Hagan.    She does have a family history of coronary artery disease with both of her parents undergoing bypass surgery in her 70s.  Past Medical History:   Diagnosis Date   • Allergies    • Arthritis    • Diabetes (CMS/[facility])    • DM2 (diabetes mellitus, type 2) (CMS/[facility]) 12/1/2020   • Hemorrhoids    • History of partial hysterectomy 12/1/2020   • Hx of appendectomy 12/1/2020   • Hx of colonic polyps 12/1/2020   • Hx of tonsillectomy 12/1/2020   • Hyperlipidemia 12/1/2020   • Lipid disorder    • Osteoarthritis of right knee 12/1/2020   • Seasonal allergies 12/1/2020   • Vitamin D deficiency 6/14/2021     Past Surgical History:   Procedure Laterality Date   • APPENDECTOMY     • HYSTERECTOMY     • TONSILLECTOMY       Atorvastatin, Rosuvastatin calcium, Sitagliptin, Lidocaine hcl, Glimepiride, Penicillins, Pioglitazone, and Procaine  Current Outpatient Medications   Medication Sig Dispense Refill   • diclofenac sodium (VOLTAREN ARTHRITIS PAIN) 1 % topical gel Apply topically as needed for mild pain.     • metFORMIN XR (GLUCOPHAGE XR) 500 mg 24 hr tablet Take 1 tablet (500 mg total) by mouth daily with breakfast. 90 tablet 1     No current facility-administered medications for this visit.     Social History     Socioeconomic History   • Marital status:      Spouse name: None   • Number of children: None   • Years of education: None   • Highest education level: None   Occupational History   • Occupation: medical social worker   Tobacco Use   • Smoking status: Never Smoker   • Smokeless tobacco: Never Used   Vaping Use   • Vaping Use: Never used   Substance and Sexual Activity   • Alcohol use: Not Currently   • Drug use: Never   • Sexual activity: Yes     Partners: Male   Other Topics Concern   • None   Social History Narrative   • None     Social Determinants of Health     Financial Resource Strain:    • Difficulty of Paying Living Expenses: Not on  file   Food Insecurity:    • Worried About Running Out of Food in the Last Year: Not on file   • Ran Out of Food in the Last Year: Not on file   Transportation Needs:    • Lack of Transportation (Medical): Not on file   • Lack of Transportation (Non-Medical): Not on file   Physical Activity:    • Days of Exercise per Week: Not on file   • Minutes of Exercise per Session: Not on file   Stress:    • Feeling of Stress : Not on file   Social Connections:    • Frequency of Communication with Friends and Family: Not on file   • Frequency of Social Gatherings with Friends and Family: Not on file   • Attends Worship Services: Not on file   • Active Member of Clubs or Organizations: Not on file   • Attends Club or Organization Meetings: Not on file   • Marital Status: Not on file   Intimate Partner Violence:    • Fear of Current or Ex-Partner: Not on file   • Emotionally Abused: Not on file   • Physically Abused: Not on file   • Sexually Abused: Not on file   Housing Stability:    • Unable to Pay for Housing in the Last Year: Not on file   • Number of Places Lived in the Last Year: Not on file   • Unstable Housing in the Last Year: Not on file     History reviewed. No pertinent family history.     Review of Systems   Constitutional: Positive for decreased appetite.   Cardiovascular: Positive for palpitations.   Gastrointestinal: Positive for constipation.   All other systems reviewed and are negative.     Objective   There were no vitals filed for this visit.     Physical Exam  Vitals and nursing note reviewed.   Constitutional:       General: She is not in acute distress.     Appearance: Normal appearance. She is normal weight.   HENT:      Head: Normocephalic and atraumatic.      Right Ear: External ear normal.      Left Ear: External ear normal.      Nose: Nose normal.      Mouth/Throat:      Mouth: Mucous membranes are moist.   Eyes:      General: No scleral icterus.     Conjunctiva/sclera: Conjunctivae normal.       Pupils: Pupils are equal, round, and reactive to light.   Neck:      Vascular: No carotid bruit.   Cardiovascular:      Rate and Rhythm: Normal rate and regular rhythm.      Pulses: Normal pulses.      Heart sounds: No murmur heard.      Pulmonary:      Effort: Pulmonary effort is normal.      Breath sounds: Normal breath sounds.   Abdominal:      General: Abdomen is flat. Bowel sounds are normal.      Palpations: Abdomen is soft.   Musculoskeletal:         General: No swelling. Normal range of motion.      Cervical back: Normal range of motion and neck supple.   Skin:     General: Skin is warm and dry.      Findings: No rash.   Neurological:      General: No focal deficit present.      Mental Status: She is oriented to person, place, and time.      Deep Tendon Reflexes: Reflexes normal.   Psychiatric:         Mood and Affect: Mood normal.         Behavior: Behavior normal.         Thought Content: Thought content normal.         Judgment: Judgment normal.         Labs   Lab Results   Component Value Date    WBC 8.58 06/10/2021    HGB 13.8 06/10/2021    HCT 42.5 06/10/2021     06/10/2021    CHOL 292 (H) 06/10/2021    TRIG 345 (H) 06/10/2021    HDL 48 (L) 06/10/2021    ALT 16 06/10/2021    AST 16 06/10/2021     06/10/2021    K 4.4 06/10/2021     06/10/2021    CREATININE 0.8 06/10/2021    BUN 20 06/10/2021    CO2 25 06/10/2021    TSH 1.20 06/10/2021    HGBA1C 6.9 (H) 06/10/2021    MICROALBUR 3.0 06/10/2021     EKG      ASSESSMENT/PLAN    Hyperlipidemia    2/11/202 Coronary CT: Calcium score=18.  50-75 percentile.  Intolerant to statins    Patient had a lipid panel drawn on Asha 10, 2021.  Total cholesterol 292, triglycerides 345, HDL 48 with an LDL of 175. She is not interested in a PCSK9 inhibitor.  She adheres to a heart healthy diet. She follows up with Dr. Fatima at [facility].     DM2 (diabetes mellitus, type 2) (CMS/[facility])  The patient had a hemoglobin A1c of 6.9 on Asha 10, 2021.    Heart  palpitations  2/12/2020 echo: EF 64%. No RWMA noted.  No gross valvular abnormalities noted.    The patient did take some Tylenol last week which helped with her palpitations.  She has not had an episode since.  She has a normal structural heart based on echocardiography from last year.  She had laboratory studies drawn over the summer including a TSH that were within normal limits.  Therefore we do not believe she needs further testing at this time.  We did tell her to call our office if she has any further episodes.  At that time we could place her on a long-term monitor.  She is agreeable to this plan.    By signing my name below, I, Carrie Dye, attest that this documentation has been prepared under the direction and in the presence of Jorge Alberto Madison MD.     CONCHITA Kay  9/23/2021     The patient was seen and examined.  An independent interval history was obtained and physical exam performed.  I agree with the information and findings as documented above.    She has not had any symptoms of palpitations for over a week now.  The previous pattern did not have any identifiable provocative factors that could be identified.  Her physical exam is normal.  She previously has had an extensive a complete cardiac evaluation including coronary artery calcium score, lipid profile, electrocardiogram and resting echocardiography.  The studies were all unrevealing of any concerning abnormality.    In view of the sporadic nature of her symptoms, I do not think a Holter monitor or even a short course of an MCOT would be helpful at this time.  I discussed this impression with her and I believe she understands that the sporadic nature of her symptoms not clearly amenable to current technologies for surveillance other than the consideration of a loop recorder which I do not think is indicated at this time.    No limits placed on her activity.  No other cardiac testing recommended at present.  No new medications  advised.  She has been encouraged to call here with any further questions or concerns and if her palpitations should return, we would be happy discuss further evaluation of her complaint at that time.    Jorge Alberto Madison MD

## 2021-09-23 NOTE — ASSESSMENT & PLAN NOTE
2/12/2020 echo: EF 64%. No RWMA noted.  No gross valvular abnormalities noted.    The patient did take some Tylenol last week which helped with her palpitations.  She has not had an episode since.  She has a normal structural heart based on echocardiography from last year.  She had laboratory studies drawn over the summer including a TSH that were within normal limits.  Therefore we do not believe she needs further testing at this time.  We did tell her to call our office if she has any further episodes.  At that time we could place her on a long-term monitor.  She is agreeable to this plan.

## 2021-09-23 NOTE — ASSESSMENT & PLAN NOTE
2/11/202 Coronary CT: Calcium score=18.  50-75 percentile.  Intolerant to statins    Patient had a lipid panel drawn on Asha 10, 2021.  Total cholesterol 292, triglycerides 345, HDL 48 with an LDL of 175. She is not interested in a PCSK9 inhibitor.  She adheres to a heart healthy diet. She follows up with Dr. Fatima at [facility].

## 2021-09-28 DIAGNOSIS — R00.2 PALPITATIONS: Primary | ICD-10-CM

## 2021-09-29 ENCOUNTER — APPOINTMENT (OUTPATIENT)
Dept: CARDIOLOGY | Facility: CLINIC | Age: 71
End: 2021-09-29
Payer: MEDICARE

## 2021-10-05 ENCOUNTER — TELEPHONE (OUTPATIENT)
Dept: CARDIOLOGY | Facility: CLINIC | Age: 71
End: 2021-10-05

## 2021-10-05 NOTE — TELEPHONE ENCOUNTER
Pt of Dr. Madison- Requesting results of holter monitor returned Friday. Aware they results may not be available quite yet.     Can be reached at [phone]

## 2021-10-07 PROCEDURE — 93224 XTRNL ECG REC UP TO 48 HRS: CPT | Performed by: INTERNAL MEDICINE

## 2021-10-08 ENCOUNTER — TELEPHONE (OUTPATIENT)
Dept: CARDIOLOGY | Facility: CLINIC | Age: 71
End: 2021-10-08

## 2021-10-08 NOTE — TELEPHONE ENCOUNTER
----- Message from Maryam Jaquez MA sent at 10/8/2021 12:49 PM EDT -----  Regarding: FW: Halter Monitor      ----- Message -----  From: Whitney Ordaz  Sent: 10/8/2021  11:16 AM EDT  To: , #  Subject: Halter Monitor                                   Hi Carrie,  There seems to  be deficiencies in the halter monitor  result.  Need advice on the action needed to correct  it.  Thank you.  Whitney Prince

## 2021-10-08 NOTE — TELEPHONE ENCOUNTER
Called and spoke with patient, would like to discuss her Holter monitor results further.  I did reassure her that PVCs can be a normal finding depending on how frequently and how often they are discovered.  Patient would like to discuss with Noemi or Dr. Madison upon his return.  Did advise patient that Dr. Brown was out of the office next week, patient verbalized understanding.      Interpretation Summary    1. The patient was monitored for 48 hours.  2. The predominant rhythm was sinus rhythm.  3. The average heart rate was 79 bpm.  4. The minimum heart rate was 61 bpm.  5. The maximum heart rate was 119 bpm.  6. There was 1 supraventricular beat.  7. There were 2834 premature ventricular beats(1.3%).  8. Patient logged symptoms of palpitations and sometimes tightnes around sternum area. The ECG showed sinus rhythm with PVCs.  10/7/2021  Confirmed by Maribel Sutherland ([id number]) on 10/7/2021 12:51:48 PM

## 2021-10-11 NOTE — TELEPHONE ENCOUNTER
I spoke to the patient.  She has been having symptomatic PVCs.  She is going to try over-the-counter magnesium.  Can someone from EP call her and please give her a new patient appointment to discuss further options.  Thank you

## 2021-10-19 NOTE — PROGRESS NOTES
Electrophysiology         Reason for visit: PVCs  Referred by :Dr Madison     History of Present Illness:  Patient is a 71-year-old woman with past medical history of diabetes, elevated coronary calcium score and PVCs who was referred to our office for management of PVCs.    Patient has been having palpitations since childhood.  However recently started having worsening of palpitations.  She describes them as skipped beats which lasts for 1 to 2 hours before they subside.  Not associated with lightheadedness, syncope or presyncope.  She was seen by Dr. Madison and she had a Holter monitor which showed infrequent symptomatic PVCs (burden 1.3%).  She was referred to us for management of PVCs.    A comprehensive 15-point review of systems was performed and was negative unless specifically mentioned in the History of Present Illness.        Past Medical History:   Diagnosis Date   • Allergies    • Arthritis    • Diabetes (CMS/[facility])    • DM2 (diabetes mellitus, type 2) (CMS/[facility]) 12/1/2020   • Hemorrhoids    • History of partial hysterectomy 12/1/2020   • Hx of appendectomy 12/1/2020   • Hx of colonic polyps 12/1/2020   • Hx of tonsillectomy 12/1/2020   • Hyperlipidemia 12/1/2020   • Lipid disorder    • Osteoarthritis of right knee 12/1/2020   • Seasonal allergies 12/1/2020   • Vitamin D deficiency 6/14/2021       Past Surgical History:   Procedure Laterality Date   • APPENDECTOMY     • HYSTERECTOMY     • TONSILLECTOMY         Current Outpatient Medications on File Prior to Visit   Medication Sig Dispense Refill   • CHOLECALCIFEROL, VITAMIN D3, ORAL Take by mouth daily.     • diclofenac sodium (VOLTAREN ARTHRITIS PAIN) 1 % topical gel Apply topically as needed for mild pain.     • magnesium oxide 400 mg magnesium capsule Take 400 mg by mouth daily.     • metFORMIN XR (GLUCOPHAGE XR) 500 mg 24 hr tablet Take 1 tablet (500 mg total) by mouth daily with breakfast. 90 tablet 1     No current facility-administered medications  on file prior to visit.       Allergies, Social History and Family History were reviewed and updated in EMR.     Physical Examination:  Vitals:    10/21/21 1626   BP: 122/78   Pulse: 86   Resp: 16     Constitutional: The patient appeared physically well and in no acute distress.  Respiratory: Clear to auscultation, no wheezes or rubs.  Cardiovascular: A comprehensive cardiovascular examination was performed. Highlights include normal jugular venous pressure, 2+ carotids, no bruits. The precordium is quiet. Regular rhythm, rate, S1 and S2 normal, no rubs, or gallops were appreciated. Murmurs: No pathologic murmurs appreciated.  Musculoskeletal/ Extremities: No edema, normal peripheral pulses.  Skin: No rashes or lesions apparent.       Lab Results   Component Value Date    WBC 8.58 06/10/2021    HGB 13.8 06/10/2021    HCT 42.5 06/10/2021     06/10/2021    CHOL 292 (H) 06/10/2021    TRIG 345 (H) 06/10/2021    HDL 48 (L) 06/10/2021    LDLCALC 175 (H) 06/10/2021    ALT 16 06/10/2021    AST 16 06/10/2021     06/10/2021    K 4.4 06/10/2021    CREATININE 0.8 06/10/2021    BUN 20 06/10/2021    TSH 1.20 06/10/2021     ECG  normal sinus rhythm, normal axis, no ST or TW changes, low voltage.     Holter 9/29/2021:  1. The patient was monitored for 48 hours.  2. The predominant rhythm was sinus rhythm.  3. The average heart rate was 79 bpm.  4. The minimum heart rate was 61 bpm.  5. The maximum heart rate was 119 bpm.  6. There was 1 supraventricular beat.  7. There were 2834 premature ventricular beats(1.3%).  8. Patient logged symptoms of palpitations and sometimes tightnes around sternum area. The ECG showed sinus rhythm with PVCs.  10/7/2021     Assessment and plan:  Patient is a 71-year-old woman with past medical history of hypertension, hyperlipidemia, diabetes, elevated coronary calcium score and PVCs who was referred to our office for management of PVCs.    She has very infrequent PVCs.  I start her on  metoprolol 25 mg daily and she can take an extra metoprolol as needed for management of PVCs.  Given the low burden of PVCs, no further EP work-up is needed.  She can follow-up with me as needed if she started to have more frequent PVCs on metoprolol.    I spent 45 minutes with the patient for record review, examination, care coordination and counseling.     This letter was generated using speech recognition software. Please excuse any typographical errors.       Amador Woodruff MD, [facility]  Cardiac Electrophysiology  [facility]  10/21/2021

## 2021-10-21 ENCOUNTER — OFFICE VISIT (OUTPATIENT)
Dept: CARDIOLOGY | Facility: CLINIC | Age: 71
End: 2021-10-21
Payer: MEDICARE

## 2021-10-21 VITALS
DIASTOLIC BLOOD PRESSURE: 78 MMHG | RESPIRATION RATE: 16 BRPM | SYSTOLIC BLOOD PRESSURE: 122 MMHG | BODY MASS INDEX: 19.63 KG/M2 | WEIGHT: 104 LBS | HEART RATE: 86 BPM | HEIGHT: 61 IN

## 2021-10-21 DIAGNOSIS — R00.2 HEART PALPITATIONS: Primary | ICD-10-CM

## 2021-10-21 PROCEDURE — 99204 OFFICE O/P NEW MOD 45 MIN: CPT | Performed by: INTERNAL MEDICINE

## 2021-10-21 PROCEDURE — 93000 ELECTROCARDIOGRAM COMPLETE: CPT | Performed by: INTERNAL MEDICINE

## 2021-10-21 RX ORDER — METOPROLOL SUCCINATE 25 MG/1
25 TABLET, EXTENDED RELEASE ORAL DAILY
Qty: 90 TABLET | Refills: 3 | Status: SHIPPED | OUTPATIENT
Start: 2021-10-21 | End: 2022-10-18

## 2021-10-21 RX ORDER — CALCIUM CARBONATE/VITAMIN D3 500-10/5ML
400 LIQUID (ML) ORAL DAILY
COMMUNITY
End: 2022-06-17

## 2021-10-21 NOTE — LETTER
October 21, 2021     Jorge Alberto Madison MD  [address] [facility]  Heart [address]  [place] [address]    Patient: Whitney Ordaz  YOB: 1950  Date of Visit: 10/21/2021      Dear Dr. Madison:    Thank you for referring Whitney Ordaz to me for evaluation. Below are my notes for this consultation.    If you have questions, please do not hesitate to call me. I look forward to following your patient along with you.         Sincerely,        Amador Woodruff MD        CC: MD Ranjan Gautam Ali R, MD  10/21/2021  4:51 PM  Signed       Electrophysiology         Reason for visit: PVCs  Referred by :Dr Madison     History of Present Illness:  Patient is a 71-year-old woman with past medical history of diabetes, elevated coronary calcium score and PVCs who was referred to our office for management of PVCs.    Patient has been having palpitations since childhood.  However recently started having worsening of palpitations.  She describes them as skipped beats which lasts for 1 to 2 hours before they subside.  Not associated with lightheadedness, syncope or presyncope.  She was seen by Dr. Madison and she had a Holter monitor which showed infrequent symptomatic PVCs (burden 1.3%).  She was referred to us for management of PVCs.    A comprehensive 15-point review of systems was performed and was negative unless specifically mentioned in the History of Present Illness.        Past Medical History:   Diagnosis Date   • Allergies    • Arthritis    • Diabetes (CMS/[facility])    • DM2 (diabetes mellitus, type 2) (CMS/[facility]) 12/1/2020   • Hemorrhoids    • History of partial hysterectomy 12/1/2020   • Hx of appendectomy 12/1/2020   • Hx of colonic polyps 12/1/2020   • Hx of tonsillectomy 12/1/2020   • Hyperlipidemia 12/1/2020   • Lipid disorder    • Osteoarthritis of right knee 12/1/2020   • Seasonal allergies 12/1/2020   • Vitamin D deficiency 6/14/2021       Past Surgical History:   Procedure Laterality  Date   • APPENDECTOMY     • HYSTERECTOMY     • TONSILLECTOMY         Current Outpatient Medications on File Prior to Visit   Medication Sig Dispense Refill   • CHOLECALCIFEROL, VITAMIN D3, ORAL Take by mouth daily.     • diclofenac sodium (VOLTAREN ARTHRITIS PAIN) 1 % topical gel Apply topically as needed for mild pain.     • magnesium oxide 400 mg magnesium capsule Take 400 mg by mouth daily.     • metFORMIN XR (GLUCOPHAGE XR) 500 mg 24 hr tablet Take 1 tablet (500 mg total) by mouth daily with breakfast. 90 tablet 1     No current facility-administered medications on file prior to visit.       Allergies, Social History and Family History were reviewed and updated in EMR.     Physical Examination:  Vitals:    10/21/21 1626   BP: 122/78   Pulse: 86   Resp: 16     Constitutional: The patient appeared physically well and in no acute distress.  Respiratory: Clear to auscultation, no wheezes or rubs.  Cardiovascular: A comprehensive cardiovascular examination was performed. Highlights include normal jugular venous pressure, 2+ carotids, no bruits. The precordium is quiet. Regular rhythm, rate, S1 and S2 normal, no rubs, or gallops were appreciated. Murmurs: No pathologic murmurs appreciated.  Musculoskeletal/ Extremities: No edema, normal peripheral pulses.  Skin: No rashes or lesions apparent.       Lab Results   Component Value Date    WBC 8.58 06/10/2021    HGB 13.8 06/10/2021    HCT 42.5 06/10/2021     06/10/2021    CHOL 292 (H) 06/10/2021    TRIG 345 (H) 06/10/2021    HDL 48 (L) 06/10/2021    LDLCALC 175 (H) 06/10/2021    ALT 16 06/10/2021    AST 16 06/10/2021     06/10/2021    K 4.4 06/10/2021    CREATININE 0.8 06/10/2021    BUN 20 06/10/2021    TSH 1.20 06/10/2021     ECG  normal sinus rhythm, normal axis, no ST or TW changes, low voltage.     Holter 9/29/2021:  1. The patient was monitored for 48 hours.  2. The predominant rhythm was sinus rhythm.  3. The average heart rate was 79 bpm.  4. The  minimum heart rate was 61 bpm.  5. The maximum heart rate was 119 bpm.  6. There was 1 supraventricular beat.  7. There were 2834 premature ventricular beats(1.3%).  8. Patient logged symptoms of palpitations and sometimes tightnes around sternum area. The ECG showed sinus rhythm with PVCs.  10/7/2021     Assessment and plan:  Patient is a 71-year-old woman with past medical history of hypertension, hyperlipidemia, diabetes, elevated coronary calcium score and PVCs who was referred to our office for management of PVCs.    She has very infrequent PVCs.  I start her on metoprolol 25 mg daily and she can take an extra metoprolol as needed for management of PVCs.  Given the low burden of PVCs, no further EP work-up is needed.  She can follow-up with me as needed if she started to have more frequent PVCs on metoprolol.    I spent 45 minutes with the patient for record review, examination, care coordination and counseling.     This letter was generated using speech recognition software. Please excuse any typographical errors.       Amador Woodruff MD, [facility]  Cardiac Electrophysiology  [facility]  10/21/2021

## 2021-11-02 ENCOUNTER — TELEPHONE (OUTPATIENT)
Dept: CARDIOLOGY | Facility: CLINIC | Age: 71
End: 2021-11-02

## 2021-11-02 NOTE — TELEPHONE ENCOUNTER
----- Message from Christy Denney MA sent at 11/2/2021  3:34 PM EDT -----  Regarding: FW: Medication      ----- Message -----  From: Whitney Ordaz  Sent: 11/2/2021   3:11 PM EDT  To: , #  Subject: Medication                                       Dr. Ranjan Dunbar prescribed Metoprolol for my heart  palpitations.  I have the pills but I haven't taken them   reading about all the side effects.  Honestly, I am  nervous.  Can you give me a call if able.  Thank you.  Whitney Prince

## 2021-12-10 ENCOUNTER — TELEPHONE (OUTPATIENT)
Dept: INTERNAL MEDICINE | Facility: CLINIC | Age: 71
End: 2021-12-10
Payer: COMMERCIAL

## 2021-12-10 DIAGNOSIS — E11.9 TYPE 2 DIABETES MELLITUS WITHOUT COMPLICATION, WITHOUT LONG-TERM CURRENT USE OF INSULIN (CMS/HCC): Primary | ICD-10-CM

## 2021-12-10 DIAGNOSIS — E78.5 HYPERLIPIDEMIA, UNSPECIFIED HYPERLIPIDEMIA TYPE: ICD-10-CM

## 2021-12-10 DIAGNOSIS — E55.9 VITAMIN D DEFICIENCY: ICD-10-CM

## 2021-12-10 NOTE — TELEPHONE ENCOUNTER
----- Message from Whitney Ordaz sent at 12/10/2021  9:14 AM EST -----  Regarding: Script for blood draw for panel not just A1C  Dr. Soto:  Spoke to [facility] who have a script for A1C  only.  I am requesting a full panel that I usually get with  lipid information etc.  I have an appt. with you on   12/15.  Thank you.  Whitney Prince   Detail Level: Detailed Silver Nitrate Text: The wound bed was treated with silver nitrate after the biopsy was performed. Bill 70814 For Specimen Handling/Conveyance To Laboratory?: no Wound Care: Petrolatum Type Of Destruction Used: Curettage Billing Type: Third-Party Bill Biopsy Type: H and E X Size Of Lesion In Cm: 0 Anesthesia Volume In Cc (Will Not Render If 0): 0.5 Lab: 540 Dressing: bandage Post-Care Instructions: I reviewed with the patient in detail post-care instructions. Patient is to keep the biopsy site dry overnight, and then apply bacitracin twice daily until healed. Patient may apply hydrogen peroxide soaks to remove any crusting. Notification Instructions: Patient will be notified of biopsy results. However, patient instructed to call the office if not contacted within 2 weeks. Hemostasis: Drysol Depth Of Biopsy: dermis Lab Facility: [id number] Biopsy Method: scissors Curettage Text: The wound bed was treated with curettage after the biopsy was performed. Electrodesiccation Text: The wound bed was treated with electrodesiccation after the biopsy was performed. Cryotherapy Text: The wound bed was treated with cryotherapy after the biopsy was performed. Electrodesiccation And Curettage Text: The wound bed was treated with electrodesiccation and curettage after the biopsy was performed. Consent: Written consent was obtained and risks were reviewed including but not limited to scarring, infection, bleeding, scabbing, incomplete removal, nerve damage and allergy to anesthesia. Render Post-Care Instructions In Note?: yes Size Of Lesion In Cm: 1.4 Anesthesia Type: 1% lidocaine with epinephrine

## 2021-12-13 ENCOUNTER — APPOINTMENT (OUTPATIENT)
Dept: LAB | Facility: HOSPITAL | Age: 71
End: 2021-12-13
Attending: INTERNAL MEDICINE
Payer: MEDICARE

## 2021-12-13 DIAGNOSIS — E11.9 TYPE 2 DIABETES MELLITUS WITHOUT COMPLICATION, WITHOUT LONG-TERM CURRENT USE OF INSULIN (CMS/HCC): ICD-10-CM

## 2021-12-13 DIAGNOSIS — E78.5 HYPERLIPIDEMIA, UNSPECIFIED HYPERLIPIDEMIA TYPE: ICD-10-CM

## 2021-12-13 DIAGNOSIS — E55.9 VITAMIN D DEFICIENCY: ICD-10-CM

## 2021-12-13 LAB
25(OH)D3 SERPL-MCNC: 33 NG/ML (ref 30–100)
ALBUMIN SERPL-MCNC: 4.3 G/DL (ref 3.4–5)
ALP SERPL-CCNC: 54 IU/L (ref 35–126)
ALT SERPL-CCNC: 16 IU/L (ref 11–54)
ANION GAP SERPL CALC-SCNC: 14 MEQ/L (ref 3–15)
AST SERPL-CCNC: 16 IU/L (ref 15–41)
BASOPHILS # BLD: 0.07 K/UL (ref 0.01–0.1)
BASOPHILS NFR BLD: 0.8 %
BILIRUB SERPL-MCNC: 0.6 MG/DL (ref 0.3–1.2)
BUN SERPL-MCNC: 17 MG/DL (ref 8–20)
CALCIUM SERPL-MCNC: 10.1 MG/DL (ref 8.9–10.3)
CHLORIDE SERPL-SCNC: 103 MEQ/L (ref 98–109)
CO2 SERPL-SCNC: 25 MEQ/L (ref 22–32)
CREAT SERPL-MCNC: 0.8 MG/DL (ref 0.6–1.1)
DIFFERENTIAL METHOD BLD: NORMAL
EOSINOPHIL # BLD: 0.19 K/UL (ref 0.04–0.36)
EOSINOPHIL NFR BLD: 2.2 %
ERYTHROCYTE [DISTWIDTH] IN BLOOD BY AUTOMATED COUNT: 12.4 % (ref 11.7–14.4)
EST. AVERAGE GLUCOSE BLD GHB EST-MCNC: 146 MG/DL
GFR SERPL CREATININE-BSD FRML MDRD: >60 ML/MIN/1.73M*2
GLUCOSE SERPL-MCNC: 117 MG/DL (ref 70–99)
HBA1C MFR BLD HPLC: 6.7 %
HCT VFR BLDCO AUTO: 40.7 % (ref 35–45)
HGB BLD-MCNC: 13.2 G/DL (ref 11.8–15.7)
IMM GRANULOCYTES # BLD AUTO: 0.02 K/UL (ref 0–0.08)
IMM GRANULOCYTES NFR BLD AUTO: 0.2 %
LYMPHOCYTES # BLD: 3.26 K/UL (ref 1.2–3.5)
LYMPHOCYTES NFR BLD: 37.5 %
MCH RBC QN AUTO: 29.4 PG (ref 28–33.2)
MCHC RBC AUTO-ENTMCNC: 32.4 G/DL (ref 32.2–35.5)
MCV RBC AUTO: 90.6 FL (ref 83–98)
MONOCYTES # BLD: 0.72 K/UL (ref 0.28–0.8)
MONOCYTES NFR BLD: 8.3 %
NEUTROPHILS # BLD: 4.44 K/UL (ref 1.7–7)
NEUTS SEG NFR BLD: 51 %
NRBC BLD-RTO: 0 %
PDW BLD AUTO: 9.8 FL (ref 9.4–12.3)
PLATELET # BLD AUTO: 282 K/UL (ref 150–369)
POTASSIUM SERPL-SCNC: 4.1 MEQ/L (ref 3.6–5.1)
PROT SERPL-MCNC: 6.4 G/DL (ref 6–8.2)
RBC # BLD AUTO: 4.49 M/UL (ref 3.93–5.22)
SODIUM SERPL-SCNC: 142 MEQ/L (ref 136–144)
TSH SERPL DL<=0.05 MIU/L-ACNC: 2.12 MIU/L (ref 0.34–5.6)
WBC # BLD AUTO: 8.7 K/UL (ref 3.8–10.5)

## 2021-12-13 PROCEDURE — 82306 VITAMIN D 25 HYDROXY: CPT

## 2021-12-13 PROCEDURE — 84443 ASSAY THYROID STIM HORMONE: CPT

## 2021-12-13 PROCEDURE — 80061 LIPID PANEL: CPT

## 2021-12-13 PROCEDURE — 36415 COLL VENOUS BLD VENIPUNCTURE: CPT

## 2021-12-13 PROCEDURE — 83036 HEMOGLOBIN GLYCOSYLATED A1C: CPT

## 2021-12-13 PROCEDURE — 85025 COMPLETE CBC W/AUTO DIFF WBC: CPT

## 2021-12-13 PROCEDURE — 80053 COMPREHEN METABOLIC PANEL: CPT

## 2021-12-14 ENCOUNTER — TELEPHONE (OUTPATIENT)
Dept: INTERNAL MEDICINE | Facility: CLINIC | Age: 71
End: 2021-12-14
Payer: COMMERCIAL

## 2021-12-14 LAB
CHOLEST SERPL-MCNC: 291 MG/DL
HDLC SERPL-MCNC: 52 MG/DL
HDLC SERPL: 5.6 {RATIO}
LDLC SERPL CALC-MCNC: 198 MG/DL
NONHDLC SERPL-MCNC: 239 MG/DL
TRIGL SERPL-MCNC: 207 MG/DL (ref 30–149)

## 2021-12-14 NOTE — TELEPHONE ENCOUNTER
Disc labs. dm2 controlled but chol very high. She cant take statins. Advised pt to disc alt med with her cardiologist dr gonzalez.

## 2021-12-15 ENCOUNTER — OFFICE VISIT (OUTPATIENT)
Dept: INTERNAL MEDICINE | Facility: CLINIC | Age: 71
End: 2021-12-15
Payer: MEDICARE

## 2021-12-15 VITALS
WEIGHT: 109.6 LBS | OXYGEN SATURATION: 98 % | TEMPERATURE: 98.3 F | BODY MASS INDEX: 20.69 KG/M2 | SYSTOLIC BLOOD PRESSURE: 130 MMHG | HEIGHT: 61 IN | RESPIRATION RATE: 16 BRPM | DIASTOLIC BLOOD PRESSURE: 80 MMHG | HEART RATE: 88 BPM

## 2021-12-15 DIAGNOSIS — R00.2 HEART PALPITATIONS: Chronic | ICD-10-CM

## 2021-12-15 DIAGNOSIS — Z86.0100 HX OF COLONIC POLYPS: ICD-10-CM

## 2021-12-15 DIAGNOSIS — M19.049 HAND ARTHRITIS: ICD-10-CM

## 2021-12-15 DIAGNOSIS — Z78.0 ASYMPTOMATIC MENOPAUSAL STATE: ICD-10-CM

## 2021-12-15 DIAGNOSIS — E11.9 TYPE 2 DIABETES MELLITUS WITHOUT COMPLICATION, WITHOUT LONG-TERM CURRENT USE OF INSULIN (CMS/HCC): ICD-10-CM

## 2021-12-15 DIAGNOSIS — E78.5 HYPERLIPIDEMIA, UNSPECIFIED HYPERLIPIDEMIA TYPE: Primary | ICD-10-CM

## 2021-12-15 PROCEDURE — 99214 OFFICE O/P EST MOD 30 MIN: CPT | Performed by: INTERNAL MEDICINE

## 2021-12-15 ASSESSMENT — ENCOUNTER SYMPTOMS
BACK PAIN: 0
DYSURIA: 0
CHILLS: 0
CONSTIPATION: 0
UNEXPECTED WEIGHT CHANGE: 0
POLYDIPSIA: 0
NAUSEA: 0
SORE THROAT: 0
SINUS PRESSURE: 0
WEAKNESS: 0
ABDOMINAL PAIN: 0
ARTHRALGIAS: 1
HEADACHES: 0
DIZZINESS: 0
SHORTNESS OF BREATH: 0
NERVOUS/ANXIOUS: 0
COUGH: 0
MYALGIAS: 0
PALPITATIONS: 0
HEMATURIA: 0
NECK PAIN: 0
DIARRHEA: 0
BLOOD IN STOOL: 0
ADENOPATHY: 0
EYE PAIN: 0
FATIGUE: 0
FREQUENCY: 0
CHEST TIGHTNESS: 0
WOUND: 0
CONFUSION: 0
SLEEP DISTURBANCE: 0
WHEEZING: 0
FEVER: 0
RHINORRHEA: 0
VOMITING: 0

## 2021-12-15 NOTE — PROGRESS NOTES
Subjective      Patient ID: Whitney Ordaz is a 71 y.o. female.    Here for 6 month f/u. She is doing well. Compliant with meds. Staying active. No chest symptoms. Weight stable. He fingers are botehrsome. Arthritis.       The following have been reviewed and updated as appropriate in this visit:   Allergies  Meds  Problems       Review of Systems   Constitutional: Negative for chills, fatigue, fever and unexpected weight change.   HENT: Negative for congestion, ear pain, hearing loss, rhinorrhea, sinus pressure and sore throat.    Eyes: Negative for pain and visual disturbance.   Respiratory: Negative for cough, chest tightness, shortness of breath and wheezing.    Cardiovascular: Negative for chest pain, palpitations and leg swelling.   Gastrointestinal: Negative for abdominal pain, blood in stool, constipation, diarrhea, nausea and vomiting.   Endocrine: Negative for cold intolerance, heat intolerance and polydipsia.   Genitourinary: Negative for dysuria, frequency, hematuria and urgency.   Musculoskeletal: Positive for arthralgias. Negative for back pain, myalgias and neck pain.   Skin: Negative for rash and wound.   Allergic/Immunologic: Negative for environmental allergies and food allergies.   Neurological: Negative for dizziness, syncope, weakness and headaches.   Hematological: Negative for adenopathy.   Psychiatric/Behavioral: Negative for confusion and sleep disturbance. The patient is not nervous/anxious.          Current Outpatient Medications:   •  CHOLECALCIFEROL, VITAMIN D3, ORAL, Take by mouth daily., Disp: , Rfl:   •  diclofenac sodium (VOLTAREN ARTHRITIS PAIN) 1 % topical gel, Apply topically as needed for mild pain., Disp: , Rfl:   •  magnesium oxide 400 mg magnesium capsule, Take 400 mg by mouth daily., Disp: , Rfl:   •  metFORMIN XR (GLUCOPHAGE XR) 500 mg 24 hr tablet, Take 1 tablet (500 mg total) by mouth daily with breakfast., Disp: 90 tablet, Rfl: 1  •  metoprolol succinate XL 25  "mg 24 hr tablet, Take 1 tablet (25 mg total) by mouth daily., Disp: 90 tablet, Rfl: 3    Allergies:  Atorvastatin, Rosuvastatin calcium, Sitagliptin, Lidocaine hcl, Glimepiride, Penicillins, Pioglitazone, and Procaine    Past Medical History:   Diagnosis Date   • Allergies    • Arthritis    • Diabetes (CMS/[facility])    • DM2 (diabetes mellitus, type 2) (CMS/[facility]) 12/1/2020   • Hand arthritis 12/15/2021   • Hemorrhoids    • History of partial hysterectomy 12/1/2020   • Hx of appendectomy 12/1/2020   • Hx of colonic polyps 12/1/2020   • Hx of tonsillectomy 12/1/2020   • Hyperlipidemia 12/1/2020   • Lipid disorder    • Osteoarthritis of right knee 12/1/2020   • Seasonal allergies 12/1/2020   • Vitamin D deficiency 6/14/2021       Past Surgical History:   Procedure Laterality Date   • APPENDECTOMY     • HYSTERECTOMY     • TONSILLECTOMY         Social History     Tobacco Use   • Smoking status: Never Smoker   • Smokeless tobacco: Never Used   Vaping Use   • Vaping Use: Never used   Substance Use Topics   • Alcohol use: Not Currently   • Drug use: Never       Objective     Vitals:    12/15/21 1058   BP: 130/80   Pulse: 88   Resp: 16   Temp: 36.8 °C (98.3 °F)   TempSrc: Temporal   SpO2: 98%   Weight: 49.7 kg (109 lb 9.6 oz)   Height: 1.549 m (5' 1\")       Physical Exam  Constitutional:       Appearance: She is well-developed.   HENT:      Head: Normocephalic and atraumatic.      Right Ear: External ear normal.      Left Ear: External ear normal.      Nose: Nose normal.   Eyes:      Conjunctiva/sclera: Conjunctivae normal.      Pupils: Pupils are equal, round, and reactive to light.   Neck:      Vascular: No carotid bruit.   Cardiovascular:      Rate and Rhythm: Normal rate and regular rhythm.      Heart sounds: Normal heart sounds. No murmur heard.  Pulmonary:      Effort: Pulmonary effort is normal.      Breath sounds: Normal breath sounds. No wheezing.   Abdominal:      Palpations: Abdomen is soft.   Musculoskeletal:         " General: No deformity.      Cervical back: Neck supple.   Skin:     General: Skin is warm and dry.      Findings: No rash.   Neurological:      Mental Status: She is alert and oriented to person, place, and time.   Psychiatric:         Mood and Affect: Mood is not depressed.         Behavior: Behavior normal.         Thought Content: Thought content normal.         Assessment/Plan   Hyperlipidemia, unspecified hyperlipidemia type  (primary encounter diagnosis)  Comment: very elev; she cant take statins; we disc alt like repatha; sohail will f/u with her cardiolgist, dr gonzalez    Type 2 diabetes mellitus without complication, without long-term current use of insulin (CMS/[facility])  Comment: controlled; cont med; optho per routine    Hand arthritis  Comment: this is bothersome; we disc seeing dr campo or jeremie at [facility]  Plan: DEXA BONE DENSITY    Heart palpitations  Comment: sohail is doing well on the bb    Hx of colonic polyps  Comment: overdue for colo; sohail will see dr live in [place]    Asymptomatic menopausal state   Plan: DEXA BONE DENSITY      Pt appears well; labs reviewed; rx for dexa; rec shingrix; f/u 6 months for medicare exam          Porfirio Soto MD    12/15/2021

## 2021-12-29 ENCOUNTER — OFFICE VISIT (OUTPATIENT)
Dept: INTERNAL MEDICINE | Facility: CLINIC | Age: 71
End: 2021-12-29
Payer: MEDICARE

## 2021-12-29 VITALS
SYSTOLIC BLOOD PRESSURE: 124 MMHG | WEIGHT: 109 LBS | HEART RATE: 80 BPM | BODY MASS INDEX: 20.58 KG/M2 | RESPIRATION RATE: 16 BRPM | DIASTOLIC BLOOD PRESSURE: 64 MMHG | HEIGHT: 61 IN | TEMPERATURE: 97.9 F | OXYGEN SATURATION: 99 %

## 2021-12-29 DIAGNOSIS — H66.001 NON-RECURRENT ACUTE SUPPURATIVE OTITIS MEDIA OF RIGHT EAR WITHOUT SPONTANEOUS RUPTURE OF TYMPANIC MEMBRANE: Primary | ICD-10-CM

## 2021-12-29 PROCEDURE — 99213 OFFICE O/P EST LOW 20 MIN: CPT | Performed by: INTERNAL MEDICINE

## 2021-12-29 RX ORDER — DOXYCYCLINE HYCLATE 100 MG
100 TABLET ORAL 2 TIMES DAILY
Qty: 10 TABLET | Refills: 0 | Status: SHIPPED | OUTPATIENT
Start: 2021-12-29 | End: 2022-01-03

## 2021-12-29 ASSESSMENT — ENCOUNTER SYMPTOMS
HEMATOLOGIC/LYMPHATIC NEGATIVE: 1
PSYCHIATRIC NEGATIVE: 1
CONSTITUTIONAL NEGATIVE: 1
CARDIOVASCULAR NEGATIVE: 1
MUSCULOSKELETAL NEGATIVE: 1
NEUROLOGICAL NEGATIVE: 1
EYES NEGATIVE: 1
GASTROINTESTINAL NEGATIVE: 1
ENDOCRINE NEGATIVE: 1
RESPIRATORY NEGATIVE: 1
ALLERGIC/IMMUNOLOGIC NEGATIVE: 1

## 2021-12-29 NOTE — PROGRESS NOTES
SUBJECTIVE:   71 y.o. female for a same day visit    Ear pain: the patient awoke Monday morning with severe pain in her R ear, unlike anything she has had before.  She does have chronic sinus problems and post nasal drip.  The pain is now gone.  There were no fevers or chills.  No body aches or sore throat.  No worsening of her chronic sinus congestion or pain and pressure.  She does not take anything for her sinus sx.  She had previous been using flonase.          Past Medical History:   Diagnosis Date   • Allergies    • Arthritis    • Diabetes (CMS/[facility])    • DM2 (diabetes mellitus, type 2) (CMS/[facility]) 12/1/2020   • Hand arthritis 12/15/2021   • Hemorrhoids    • History of partial hysterectomy 12/1/2020   • Hx of appendectomy 12/1/2020   • Hx of colonic polyps 12/1/2020   • Hx of tonsillectomy 12/1/2020   • Hyperlipidemia 12/1/2020   • Lipid disorder    • Osteoarthritis of right knee 12/1/2020   • Seasonal allergies 12/1/2020   • Vitamin D deficiency 6/14/2021     Patient Active Problem List   Diagnosis   • Osteoarthritis of right knee   • DM2 (diabetes mellitus, type 2) (CMS/[facility])   • Hyperlipidemia   • Hx of colonic polyps   • History of partial hysterectomy   • Seasonal allergies   • Hx of appendectomy   • Hx of tonsillectomy   • Vitamin D deficiency   • Heart palpitations   • Hand arthritis     Social History     Tobacco Use   • Smoking status: Never Smoker   • Smokeless tobacco: Never Used   Vaping Use   • Vaping Use: Never used   Substance Use Topics   • Alcohol use: Not Currently   • Drug use: Never       Current Outpatient Medications:   •  CHOLECALCIFEROL, VITAMIN D3, ORAL, Take by mouth daily., Disp: , Rfl:   •  diclofenac sodium (VOLTAREN ARTHRITIS PAIN) 1 % topical gel, Apply topically as needed for mild pain., Disp: , Rfl:   •  metFORMIN XR (GLUCOPHAGE XR) 500 mg 24 hr tablet, Take 1 tablet (500 mg total) by mouth daily with breakfast., Disp: 90 tablet, Rfl: 1  •  metoprolol succinate XL 25 mg 24 hr  "tablet, Take 1 tablet (25 mg total) by mouth daily., Disp: 90 tablet, Rfl: 3  •  doxycycline hyclate 100 mg tablet, Take 1 tablet (100 mg total) by mouth 2 (two) times a day for 5 days., Disp: 10 tablet, Rfl: 0  •  magnesium oxide 400 mg magnesium capsule, Take 400 mg by mouth daily., Disp: , Rfl:    Allergies   Allergen Reactions   • Atorvastatin      Other reaction(s): Myalgias   Myalgia - 10 mg daily, improved upon discontinuing   • Rosuvastatin Calcium      Other reaction(s): Myalgias   Myalgias - 10 mg daily and on 5 mg twice a week   • Sitagliptin      Other reaction(s): Headache , Myalgias    • Lidocaine Hcl      Other reaction(s): altered heart rate   • Glimepiride      Facial swelling   • Penicillins      Other reaction(s): Altered Heart Rate, Unknown   • Pioglitazone      Other reaction(s): Headache    • Procaine      Other reaction(s): Other  Has tolerated lidocaine topically.       Review of Systems   Constitutional: Negative.    HENT: Negative.    Eyes: Negative.    Respiratory: Negative.    Cardiovascular: Negative.    Gastrointestinal: Negative.    Endocrine: Negative.    Genitourinary: Negative.    Musculoskeletal: Negative.    Skin: Negative.    Allergic/Immunologic: Negative.    Neurological: Negative.    Hematological: Negative.    Psychiatric/Behavioral: Negative.         OBJECTIVE:  Visit Vitals  /64   Pulse 80   Temp 36.6 °C (97.9 °F) (Oral)   Resp 16   Ht 1.549 m (5' 1\")   Wt 49.4 kg (109 lb)   SpO2 99%   BMI 20.60 kg/m²      Gait:  Normal  Alert and well appearing   Awake and oriented with normal affect and appropriate behavior   HEENT: Normocephalic and atraumatic, pupils equal, round, OP clear with moist mucous membranes, very mild nasal congestion, R TM slightly bulging and erythematous, L TM normal  Neck:  supple, no thyroid enlargement, no LAD  Lungs: Normal breath sounds, lungs CTA with no RRW  Heart:Regular rate rhythm with no murmurs, gallops or rubs       Assessment/Plan     1. " Non-recurrent acute suppurative otitis media of right ear without spontaneous rupture of tympanic membrane  Given her sx without significant nasal congestion and findings on exam will treat for acute bacterial OM  - doxycycline hyclate 100 mg tablet; Take 1 tablet (100 mg total) by mouth 2 (two) times a day for 5 days.  Dispense: 10 tablet; Refill: 0  - advised flonase daily for the next 5-7 days as well

## 2022-02-07 ENCOUNTER — TELEPHONE (OUTPATIENT)
Dept: INTERNAL MEDICINE | Facility: CLINIC | Age: 72
End: 2022-02-07
Payer: COMMERCIAL

## 2022-02-07 DIAGNOSIS — Z12.31 ENCOUNTER FOR SCREENING MAMMOGRAM FOR MALIGNANT NEOPLASM OF BREAST: Primary | ICD-10-CM

## 2022-02-07 NOTE — TELEPHONE ENCOUNTER
----- Message from Whitney Ordaz sent at 2/4/2022  3:55 PM EST -----  Regarding: Mammogram  Script for yearly mammogram at [facility].  Thank you.

## 2022-04-21 ENCOUNTER — TELEPHONE (OUTPATIENT)
Dept: FAMILY MEDICINE | Facility: CLINIC | Age: 72
End: 2022-04-21
Payer: COMMERCIAL

## 2022-04-21 NOTE — TELEPHONE ENCOUNTER
Patient is having a colonoscopy next week, per instructions she was informed to get her medications adjusted prior to procedure. Are you able to contact patient and discuss.         [phone]

## 2022-05-05 RX ORDER — METFORMIN HYDROCHLORIDE 500 MG/1
TABLET, EXTENDED RELEASE ORAL
Qty: 90 TABLET | Refills: 1 | Status: SHIPPED | OUTPATIENT
Start: 2022-05-05 | End: 2022-11-04

## 2022-05-11 DIAGNOSIS — E78.5 HYPERLIPIDEMIA, UNSPECIFIED HYPERLIPIDEMIA TYPE: Primary | ICD-10-CM

## 2022-05-11 DIAGNOSIS — E11.9 TYPE 2 DIABETES MELLITUS WITHOUT COMPLICATION, WITHOUT LONG-TERM CURRENT USE OF INSULIN (CMS/HCC): ICD-10-CM

## 2022-06-14 ENCOUNTER — APPOINTMENT (OUTPATIENT)
Dept: LAB | Facility: HOSPITAL | Age: 72
End: 2022-06-14
Attending: INTERNAL MEDICINE
Payer: MEDICARE

## 2022-06-14 DIAGNOSIS — E78.5 HYPERLIPIDEMIA, UNSPECIFIED HYPERLIPIDEMIA TYPE: ICD-10-CM

## 2022-06-14 DIAGNOSIS — E11.9 TYPE 2 DIABETES MELLITUS WITHOUT COMPLICATION, WITHOUT LONG-TERM CURRENT USE OF INSULIN (CMS/HCC): ICD-10-CM

## 2022-06-14 LAB
CHOLEST SERPL-MCNC: 306 MG/DL
EST. AVERAGE GLUCOSE BLD GHB EST-MCNC: 151 MG/DL
HBA1C MFR BLD HPLC: 6.9 %
HDLC SERPL-MCNC: 50 MG/DL
HDLC SERPL: 6.1 {RATIO}
LDLC SERPL CALC-MCNC: 225 MG/DL
NONHDLC SERPL-MCNC: 256 MG/DL
TRIGL SERPL-MCNC: 156 MG/DL (ref 30–149)

## 2022-06-14 PROCEDURE — 80061 LIPID PANEL: CPT

## 2022-06-14 PROCEDURE — 83036 HEMOGLOBIN GLYCOSYLATED A1C: CPT

## 2022-06-14 PROCEDURE — 36415 COLL VENOUS BLD VENIPUNCTURE: CPT

## 2022-06-15 ENCOUNTER — TELEPHONE (OUTPATIENT)
Dept: INTERNAL MEDICINE | Facility: CLINIC | Age: 72
End: 2022-06-15
Payer: COMMERCIAL

## 2022-06-15 NOTE — TELEPHONE ENCOUNTER
Disc labs. Her dm2 is controlled but chol again is very high. Her Ct brendan score was only 18 2 yrs ago. She will see her cardiologist dr gonzalez next month.

## 2022-06-17 ENCOUNTER — OFFICE VISIT (OUTPATIENT)
Dept: INTERNAL MEDICINE | Facility: CLINIC | Age: 72
End: 2022-06-17
Payer: MEDICARE

## 2022-06-17 VITALS
SYSTOLIC BLOOD PRESSURE: 128 MMHG | RESPIRATION RATE: 16 BRPM | OXYGEN SATURATION: 98 % | WEIGHT: 111 LBS | BODY MASS INDEX: 20.96 KG/M2 | HEIGHT: 61 IN | HEART RATE: 78 BPM | DIASTOLIC BLOOD PRESSURE: 72 MMHG

## 2022-06-17 DIAGNOSIS — E78.5 HYPERLIPIDEMIA, UNSPECIFIED HYPERLIPIDEMIA TYPE: ICD-10-CM

## 2022-06-17 DIAGNOSIS — Z86.0100 HX OF COLONIC POLYPS: ICD-10-CM

## 2022-06-17 DIAGNOSIS — Z78.0 ASYMPTOMATIC MENOPAUSAL STATE: ICD-10-CM

## 2022-06-17 DIAGNOSIS — R00.2 HEART PALPITATIONS: Primary | Chronic | ICD-10-CM

## 2022-06-17 DIAGNOSIS — E11.9 TYPE 2 DIABETES MELLITUS WITHOUT COMPLICATION, WITHOUT LONG-TERM CURRENT USE OF INSULIN (CMS/HCC): ICD-10-CM

## 2022-06-17 PROCEDURE — 99214 OFFICE O/P EST MOD 30 MIN: CPT | Performed by: INTERNAL MEDICINE

## 2022-06-17 ASSESSMENT — ENCOUNTER SYMPTOMS
DIZZINESS: 0
VOMITING: 0
WOUND: 0
NERVOUS/ANXIOUS: 0
CONSTIPATION: 0
CHEST TIGHTNESS: 0
WHEEZING: 0
HEADACHES: 0
MYALGIAS: 0
FREQUENCY: 0
WEAKNESS: 0
ADENOPATHY: 0
DYSURIA: 0
SLEEP DISTURBANCE: 0
NECK PAIN: 0
CONFUSION: 0
COUGH: 0
UNEXPECTED WEIGHT CHANGE: 0
ABDOMINAL PAIN: 0
BLOOD IN STOOL: 0
FEVER: 0
HEMATURIA: 0
EYE PAIN: 0
CHILLS: 0
SINUS PRESSURE: 0
DIARRHEA: 0
NAUSEA: 0
FATIGUE: 0
SORE THROAT: 0
PALPITATIONS: 0
RHINORRHEA: 0
POLYDIPSIA: 0
BACK PAIN: 0
SHORTNESS OF BREATH: 0

## 2022-06-17 NOTE — PROGRESS NOTES
Subjective      Patient ID: Whitney Ordaz is a 72 y.o. female.    Here for annual exam. Hx reviewd. She has been well.  No new concerns. Walks for exercise. No chest symptoms. Weight stable. Compliant with meds.       The following have been reviewed and updated as appropriate in this visit:   Allergies  Meds  Problems         Review of Systems   Constitutional: Negative for chills, fatigue, fever and unexpected weight change.   HENT: Negative for congestion, ear pain, hearing loss, rhinorrhea, sinus pressure and sore throat.    Eyes: Negative for pain and visual disturbance.   Respiratory: Negative for cough, chest tightness, shortness of breath and wheezing.    Cardiovascular: Negative for chest pain, palpitations and leg swelling.   Gastrointestinal: Negative for abdominal pain, blood in stool, constipation, diarrhea, nausea and vomiting.   Endocrine: Negative for cold intolerance, heat intolerance and polydipsia.   Genitourinary: Negative for dysuria, frequency, hematuria and urgency.   Musculoskeletal: Negative for back pain, myalgias and neck pain.   Skin: Negative for rash and wound.   Allergic/Immunologic: Negative for environmental allergies and food allergies.   Neurological: Negative for dizziness, syncope, weakness and headaches.   Hematological: Negative for adenopathy.   Psychiatric/Behavioral: Negative for confusion and sleep disturbance. The patient is not nervous/anxious.          Current Outpatient Medications:   •  CHOLECALCIFEROL, VITAMIN D3, ORAL, Take by mouth daily., Disp: , Rfl:   •  diclofenac sodium (VOLTAREN) 1 % topical gel, Apply topically as needed for mild pain., Disp: , Rfl:   •  metFORMIN XR (GLUCOPHAGE-XR) 500 mg 24 hr tablet, TAKE 1 TABLET BY MOUTH EVERY DAY WITH BREAKFAST, Disp: 90 tablet, Rfl: 1  •  metoprolol succinate XL 25 mg 24 hr tablet, Take 1 tablet (25 mg total) by mouth daily., Disp: 90 tablet, Rfl: 3    Allergies:  Atorvastatin, Rosuvastatin calcium,  "Sitagliptin, Lidocaine hcl, Glimepiride, Penicillins, Pioglitazone, and Procaine    Past Medical History:   Diagnosis Date   • Allergies    • Arthritis    • Diabetes (CMS/[facility])    • DM2 (diabetes mellitus, type 2) (CMS/[facility]) 12/1/2020   • Hand arthritis 12/15/2021   • Hemorrhoids    • History of partial hysterectomy 12/1/2020   • Hx of appendectomy 12/1/2020   • Hx of colonic polyps 12/1/2020   • Hx of tonsillectomy 12/1/2020   • Hyperlipidemia 12/1/2020   • Lipid disorder    • Osteoarthritis of right knee 12/1/2020   • Seasonal allergies 12/1/2020   • Vitamin D deficiency 6/14/2021       Past Surgical History:   Procedure Laterality Date   • APPENDECTOMY     • HYSTERECTOMY     • TONSILLECTOMY         Social History     Tobacco Use   • Smoking status: Never Smoker   • Smokeless tobacco: Never Used   Vaping Use   • Vaping Use: Never used   Substance Use Topics   • Alcohol use: Not Currently   • Drug use: Never       Objective     Vitals:    06/17/22 1043   BP: 128/72   Pulse: 78   Resp: 16   SpO2: 98%   Weight: 50.3 kg (111 lb)   Height: 1.549 m (5' 1\")       Physical Exam  Constitutional:       Appearance: She is well-developed.   HENT:      Head: Normocephalic and atraumatic.      Right Ear: External ear normal.      Left Ear: External ear normal.      Nose: Nose normal.   Eyes:      Conjunctiva/sclera: Conjunctivae normal.      Pupils: Pupils are equal, round, and reactive to light.   Neck:      Vascular: No carotid bruit.   Cardiovascular:      Rate and Rhythm: Normal rate and regular rhythm.      Heart sounds: Normal heart sounds. No murmur heard.  Pulmonary:      Effort: Pulmonary effort is normal.      Breath sounds: Normal breath sounds. No wheezing.   Abdominal:      General: Bowel sounds are normal.      Palpations: Abdomen is soft.      Tenderness: There is no abdominal tenderness.   Musculoskeletal:         General: No deformity.      Cervical back: Neck supple.   Skin:     General: Skin is warm and dry.    "   Findings: No rash.   Neurological:      Mental Status: She is alert and oriented to person, place, and time.   Psychiatric:         Mood and Affect: Mood is not depressed.         Behavior: Behavior normal.         Thought Content: Thought content normal.         Assessment/Plan   Heart palpitations  (primary encounter diagnosis)  Comment: sohail is doing well on metoprolol; no chest symptoms    Type 2 diabetes mellitus without complication, without long-term current use of insulin (CMS/[facility])  Comment: controlled; cont med, reg exercise, healthy diet    Hyperlipidemia, unspecified hyperlipidemia type  Comment: very high; she cant take statins; she will disc with her cardiologst, dr gonzalez, next month    Hx of colonic polyps  Comment: colo earlier thsi yr with dr live; f/u 3 yrs  Plan: DEXA BONE DENSITY    Asymptomatic menopausal state   Comment: get dexa  Plan: DEXA BONE DENSITY    HM  Pt appears well; labs reviewed; rec shingrix; flu shot thsi fall; rx for dexa; mammo done; f/u 6 months          Porfirio Soto MD    6/17/2022

## 2022-07-28 ENCOUNTER — TELEPHONE (OUTPATIENT)
Dept: INTERNAL MEDICINE | Facility: CLINIC | Age: 72
End: 2022-07-28
Payer: COMMERCIAL

## 2022-07-28 PROBLEM — M85.80 OSTEOPENIA: Status: ACTIVE | Noted: 2022-07-28

## 2022-10-18 RX ORDER — METOPROLOL SUCCINATE 25 MG/1
TABLET, EXTENDED RELEASE ORAL
Qty: 90 TABLET | Refills: 3 | Status: SHIPPED | OUTPATIENT
Start: 2022-10-18 | End: 2023-10-03 | Stop reason: SDUPTHER

## 2022-11-04 RX ORDER — METFORMIN HYDROCHLORIDE 500 MG/1
TABLET, EXTENDED RELEASE ORAL
Qty: 90 TABLET | Refills: 1 | Status: SHIPPED | OUTPATIENT
Start: 2022-11-04 | End: 2023-02-23

## 2022-11-28 DIAGNOSIS — E11.9 TYPE 2 DIABETES MELLITUS WITHOUT COMPLICATION, WITHOUT LONG-TERM CURRENT USE OF INSULIN (CMS/HCC): Primary | ICD-10-CM

## 2022-11-28 DIAGNOSIS — E78.5 HYPERLIPIDEMIA, UNSPECIFIED HYPERLIPIDEMIA TYPE: ICD-10-CM

## 2022-11-28 DIAGNOSIS — E55.9 VITAMIN D DEFICIENCY: ICD-10-CM

## 2022-11-28 RX ORDER — OSELTAMIVIR PHOSPHATE 75 MG/1
75 CAPSULE ORAL DAILY
Qty: 10 CAPSULE | Refills: 0 | Status: SHIPPED | OUTPATIENT
Start: 2022-11-28 | End: 2022-12-08

## 2022-12-14 ENCOUNTER — APPOINTMENT (OUTPATIENT)
Dept: LAB | Facility: HOSPITAL | Age: 72
End: 2022-12-14
Attending: INTERNAL MEDICINE
Payer: MEDICARE

## 2022-12-14 DIAGNOSIS — E78.5 HYPERLIPIDEMIA, UNSPECIFIED HYPERLIPIDEMIA TYPE: ICD-10-CM

## 2022-12-14 DIAGNOSIS — E55.9 VITAMIN D DEFICIENCY: ICD-10-CM

## 2022-12-14 DIAGNOSIS — E11.9 TYPE 2 DIABETES MELLITUS WITHOUT COMPLICATION, WITHOUT LONG-TERM CURRENT USE OF INSULIN (CMS/HCC): ICD-10-CM

## 2022-12-14 LAB
25(OH)D3 SERPL-MCNC: 39 NG/ML (ref 30–100)
ALBUMIN SERPL-MCNC: 3.9 G/DL (ref 3.4–5)
ALBUMIN/CREAT UR: 8.4 UG/MG
ALP SERPL-CCNC: 62 IU/L (ref 35–126)
ALT SERPL-CCNC: 18 IU/L (ref 11–54)
ANION GAP SERPL CALC-SCNC: 12 MEQ/L (ref 3–15)
AST SERPL-CCNC: 20 IU/L (ref 15–41)
BASOPHILS # BLD: 0.07 K/UL (ref 0.01–0.1)
BASOPHILS NFR BLD: 0.8 %
BILIRUB SERPL-MCNC: 0.6 MG/DL (ref 0.3–1.2)
BUN SERPL-MCNC: 19 MG/DL (ref 8–20)
CALCIUM SERPL-MCNC: 10.1 MG/DL (ref 8.9–10.3)
CHLORIDE SERPL-SCNC: 101 MEQ/L (ref 98–109)
CHOLEST SERPL-MCNC: 239 MG/DL
CO2 SERPL-SCNC: 26 MEQ/L (ref 22–32)
CREAT SERPL-MCNC: 0.8 MG/DL (ref 0.6–1.1)
CREAT UR-MCNC: 23.8 MG/DL
DIFFERENTIAL METHOD BLD: NORMAL
EOSINOPHIL # BLD: 0.19 K/UL (ref 0.04–0.36)
EOSINOPHIL NFR BLD: 2.1 %
ERYTHROCYTE [DISTWIDTH] IN BLOOD BY AUTOMATED COUNT: 12.3 % (ref 11.7–14.4)
EST. AVERAGE GLUCOSE BLD GHB EST-MCNC: 157 MG/DL
GFR SERPL CREATININE-BSD FRML MDRD: >60 ML/MIN/1.73M*2
GLUCOSE SERPL-MCNC: 146 MG/DL (ref 70–99)
HBA1C MFR BLD HPLC: 7.1 %
HCT VFR BLDCO AUTO: 39.7 % (ref 35–45)
HDLC SERPL-MCNC: 48 MG/DL
HDLC SERPL: 5 {RATIO}
HGB BLD-MCNC: 13.2 G/DL (ref 11.8–15.7)
IMM GRANULOCYTES # BLD AUTO: 0.03 K/UL (ref 0–0.08)
IMM GRANULOCYTES NFR BLD AUTO: 0.3 %
LDLC SERPL CALC-MCNC: 133 MG/DL
LYMPHOCYTES # BLD: 3.42 K/UL (ref 1.2–3.5)
LYMPHOCYTES NFR BLD: 38.1 %
MCH RBC QN AUTO: 30.1 PG (ref 28–33.2)
MCHC RBC AUTO-ENTMCNC: 33.2 G/DL (ref 32.2–35.5)
MCV RBC AUTO: 90.6 FL (ref 83–98)
MICROALBUMIN UR-MCNC: 2 MG/L
MONOCYTES # BLD: 0.72 K/UL (ref 0.28–0.8)
MONOCYTES NFR BLD: 8 %
NEUTROPHILS # BLD: 4.54 K/UL (ref 1.7–7)
NEUTS SEG NFR BLD: 50.7 %
NONHDLC SERPL-MCNC: 191 MG/DL
NRBC BLD-RTO: 0 %
PDW BLD AUTO: 9.6 FL (ref 9.4–12.3)
PLATELET # BLD AUTO: 304 K/UL (ref 150–369)
POTASSIUM SERPL-SCNC: 4.4 MEQ/L (ref 3.6–5.1)
PROT SERPL-MCNC: 6.9 G/DL (ref 6–8.2)
RBC # BLD AUTO: 4.38 M/UL (ref 3.93–5.22)
SODIUM SERPL-SCNC: 139 MEQ/L (ref 136–144)
TRIGL SERPL-MCNC: 291 MG/DL (ref 30–149)
TSH SERPL DL<=0.05 MIU/L-ACNC: 1.82 MIU/L (ref 0.34–5.6)
WBC # BLD AUTO: 8.97 K/UL (ref 3.8–10.5)

## 2022-12-14 PROCEDURE — 84443 ASSAY THYROID STIM HORMONE: CPT

## 2022-12-14 PROCEDURE — 85025 COMPLETE CBC W/AUTO DIFF WBC: CPT

## 2022-12-14 PROCEDURE — 80061 LIPID PANEL: CPT

## 2022-12-14 PROCEDURE — 80053 COMPREHEN METABOLIC PANEL: CPT

## 2022-12-14 PROCEDURE — 36415 COLL VENOUS BLD VENIPUNCTURE: CPT

## 2022-12-14 PROCEDURE — 83036 HEMOGLOBIN GLYCOSYLATED A1C: CPT

## 2022-12-14 PROCEDURE — 82306 VITAMIN D 25 HYDROXY: CPT

## 2022-12-14 PROCEDURE — 82570 ASSAY OF URINE CREATININE: CPT

## 2022-12-15 ENCOUNTER — TELEPHONE (OUTPATIENT)
Dept: INTERNAL MEDICINE | Facility: CLINIC | Age: 72
End: 2022-12-15
Payer: COMMERCIAL

## 2022-12-15 NOTE — TELEPHONE ENCOUNTER
Disc labs. a1c is up a bit to 7.1. cont meds, healthy diet and reg exercise. Her ldl chol is much improved. She is taking zetia now. Her other labs are all normal.

## 2022-12-19 ENCOUNTER — OFFICE VISIT (OUTPATIENT)
Dept: INTERNAL MEDICINE | Facility: CLINIC | Age: 72
End: 2022-12-19
Payer: MEDICARE

## 2022-12-19 VITALS
OXYGEN SATURATION: 99 % | SYSTOLIC BLOOD PRESSURE: 132 MMHG | HEIGHT: 61 IN | RESPIRATION RATE: 16 BRPM | HEART RATE: 80 BPM | DIASTOLIC BLOOD PRESSURE: 70 MMHG | WEIGHT: 113.8 LBS | BODY MASS INDEX: 21.49 KG/M2

## 2022-12-19 DIAGNOSIS — K58.9 IRRITABLE BOWEL SYNDROME, UNSPECIFIED TYPE: ICD-10-CM

## 2022-12-19 DIAGNOSIS — M85.80 OSTEOPENIA, UNSPECIFIED LOCATION: ICD-10-CM

## 2022-12-19 DIAGNOSIS — E55.9 VITAMIN D DEFICIENCY: ICD-10-CM

## 2022-12-19 DIAGNOSIS — E78.5 HYPERLIPIDEMIA, UNSPECIFIED HYPERLIPIDEMIA TYPE: Primary | ICD-10-CM

## 2022-12-19 DIAGNOSIS — E11.9 TYPE 2 DIABETES MELLITUS WITHOUT COMPLICATION, WITHOUT LONG-TERM CURRENT USE OF INSULIN (CMS/HCC): ICD-10-CM

## 2022-12-19 DIAGNOSIS — Z86.0100 HX OF COLONIC POLYPS: ICD-10-CM

## 2022-12-19 PROCEDURE — 99214 OFFICE O/P EST MOD 30 MIN: CPT | Performed by: INTERNAL MEDICINE

## 2022-12-19 RX ORDER — EZETIMIBE 10 MG/1
TABLET ORAL
COMMUNITY
Start: 2022-11-28

## 2022-12-19 ASSESSMENT — ENCOUNTER SYMPTOMS
FATIGUE: 0
DIARRHEA: 0
PALPITATIONS: 0
HEADACHES: 0
CONSTIPATION: 0
CHILLS: 0
ADENOPATHY: 0
DIZZINESS: 0
SINUS PRESSURE: 0
SHORTNESS OF BREATH: 0
DYSURIA: 0
COUGH: 0
HEMATURIA: 0
WHEEZING: 0
EYE PAIN: 0
CHEST TIGHTNESS: 0
FREQUENCY: 0
SLEEP DISTURBANCE: 0
UNEXPECTED WEIGHT CHANGE: 0
VOMITING: 0
WEAKNESS: 0
CONFUSION: 0
NECK PAIN: 0
WOUND: 0
POLYDIPSIA: 0
ABDOMINAL PAIN: 0
BLOOD IN STOOL: 0
MYALGIAS: 0
NAUSEA: 0
NERVOUS/ANXIOUS: 0
RHINORRHEA: 0
BACK PAIN: 0
FEVER: 0
SORE THROAT: 0

## 2022-12-19 NOTE — PROGRESS NOTES
Subjective      Patient ID: Whitney Ordaz is a 72 y.o. female.    Here for routine f/u exam. Hx reviewed. She has been well.  No new concerns. Walks for exercise. No chest symptoms. Weight stable. Compliant with meds.       The following have been reviewed and updated as appropriate in this visit:   Allergies  Meds  Problems       Review of Systems   Constitutional: Negative for chills, fatigue, fever and unexpected weight change.   HENT: Negative for congestion, ear pain, hearing loss, rhinorrhea, sinus pressure and sore throat.    Eyes: Negative for pain and visual disturbance.   Respiratory: Negative for cough, chest tightness, shortness of breath and wheezing.    Cardiovascular: Negative for chest pain, palpitations and leg swelling.   Gastrointestinal: Negative for abdominal pain, blood in stool, constipation, diarrhea, nausea and vomiting.   Endocrine: Negative for cold intolerance, heat intolerance and polydipsia.   Genitourinary: Negative for dysuria, frequency, hematuria and urgency.   Musculoskeletal: Negative for back pain, myalgias and neck pain.   Skin: Negative for rash and wound.   Allergic/Immunologic: Negative for environmental allergies and food allergies.   Neurological: Negative for dizziness, syncope, weakness and headaches.   Hematological: Negative for adenopathy.   Psychiatric/Behavioral: Negative for confusion and sleep disturbance. The patient is not nervous/anxious.          Current Outpatient Medications:   •  CHOLECALCIFEROL, VITAMIN D3, ORAL, Take by mouth daily., Disp: , Rfl:   •  diclofenac sodium (VOLTAREN) 1 % topical gel, Apply topically as needed for mild pain., Disp: , Rfl:   •  ezetimibe (ZETIA) 10 mg tablet, , Disp: , Rfl:   •  metFORMIN XR (GLUCOPHAGE-XR) 500 mg 24 hr tablet, TAKE 1 TABLET BY MOUTH EVERY DAY WITH BREAKFAST, Disp: 90 tablet, Rfl: 1  •  metoprolol succinate XL (TOPROL-XL) 25 mg 24 hr tablet, TAKE 1 TABLET BY MOUTH EVERY DAY, Disp: 90 tablet, Rfl:  "3    Allergies:  Atorvastatin, Rosuvastatin calcium, Sitagliptin, Lidocaine hcl, Glimepiride, Penicillins, Pioglitazone, and Procaine    Past Medical History:   Diagnosis Date   • Allergies    • Arthritis    • Diabetes (CMS/[facility])    • DM2 (diabetes mellitus, type 2) (CMS/[facility]) 12/1/2020   • Hand arthritis 12/15/2021   • Hemorrhoids    • History of partial hysterectomy 12/1/2020   • Hx of appendectomy 12/1/2020   • Hx of colonic polyps 12/1/2020   • Hx of tonsillectomy 12/1/2020   • Hyperlipidemia 12/1/2020   • IBS (irritable bowel syndrome) 12/19/2022   • Lipid disorder    • Osteoarthritis of right knee 12/1/2020   • Osteopenia 7/28/2022   • Seasonal allergies 12/1/2020   • Vitamin D deficiency 6/14/2021       Past Surgical History:   Procedure Laterality Date   • APPENDECTOMY     • HYSTERECTOMY     • TONSILLECTOMY         Social History     Tobacco Use   • Smoking status: Never   • Smokeless tobacco: Never   Vaping Use   • Vaping Use: Never used   Substance Use Topics   • Alcohol use: Not Currently   • Drug use: Never       Objective     Vitals:    12/19/22 1037   BP: 132/70   Pulse: 80   Resp: 16   SpO2: 99%   Weight: 51.6 kg (113 lb 12.8 oz)   Height: 1.549 m (5' 1\")       Physical Exam  Constitutional:       Appearance: She is well-developed and well-nourished.   HENT:      Head: Normocephalic and atraumatic.      Right Ear: External ear normal.      Left Ear: External ear normal.      Nose: Nose normal.      Mouth/Throat:      Mouth: Oropharynx is clear and moist and mucous membranes are normal.   Eyes:      Extraocular Movements: EOM normal.      Conjunctiva/sclera: Conjunctivae normal.      Pupils: Pupils are equal, round, and reactive to light.   Cardiovascular:      Rate and Rhythm: Normal rate and regular rhythm.      Heart sounds: Normal heart sounds. No murmur heard.  Pulmonary:      Effort: Pulmonary effort is normal.      Breath sounds: Normal breath sounds. No wheezing.   Abdominal:      Palpations: " Abdomen is soft.   Musculoskeletal:         General: No deformity.      Cervical back: Neck supple.   Skin:     General: Skin is warm and dry.      Findings: No rash.   Neurological:      Mental Status: She is alert and oriented to person, place, and time.      Motor: Motor strength is normal.   Psychiatric:         Mood and Affect: Mood and affect normal. Mood is not depressed.         Behavior: Behavior normal.         Thought Content: Thought content normal.         Cognition and Memory: Cognition and memory normal.         Assessment/Plan   Hyperlipidemia, unspecified hyperlipidemia type  (primary encounter diagnosis)  Comment: surprisingly much better on zetia; she cant take statins; she will disc with her cardiologst, dr gonzalez, soon    Type 2 diabetes mellitus without complication, without long-term current use of insulin (CMS/[facility])  Comment: a1c is up a hair but still acceptible;  asked her to ty to exercise everyday    Vitamin D deficiency  Comment: cont supplemnt    Osteopenia, unspecified location  Comment: cont reg exercise    Irritable bowel syndrome, unspecified type  Comment: she now takes a otc supplemnt called dav merritt by her gi which has been very helpful    Hx of colonic polyps  Comment: colo earlier thsi yr with dr liev; f/u 3 yrs    HM  Pt aureliano[ears well; rec shingrix; f/u 6 months for a physical          Porfirio Soto MD    12/19/2022

## 2023-02-03 RX ORDER — NIRMATRELVIR AND RITONAVIR 300-100 MG
3 KIT ORAL 2 TIMES DAILY
Qty: 30 TABLET | Refills: 0 | Status: SHIPPED | OUTPATIENT
Start: 2023-02-03 | End: 2023-02-08

## 2023-02-23 RX ORDER — METFORMIN HYDROCHLORIDE 500 MG/1
TABLET, EXTENDED RELEASE ORAL
Qty: 90 TABLET | Refills: 1 | Status: SHIPPED | OUTPATIENT
Start: 2023-02-23 | End: 2023-09-28 | Stop reason: SDUPTHER

## 2023-04-11 ENCOUNTER — TELEPHONE (OUTPATIENT)
Dept: INTERNAL MEDICINE | Facility: CLINIC | Age: 73
End: 2023-04-11
Payer: MEDICARE

## 2023-04-11 DIAGNOSIS — Z12.31 ENCOUNTER FOR SCREENING MAMMOGRAM FOR MALIGNANT NEOPLASM OF BREAST: Primary | ICD-10-CM

## 2023-06-09 ENCOUNTER — TELEPHONE (OUTPATIENT)
Dept: INTERNAL MEDICINE | Facility: CLINIC | Age: 73
End: 2023-06-09

## 2023-06-09 NOTE — TELEPHONE ENCOUNTER
Patient said her cardiologist needs her to have blood work done for: CBC, Lipid Panel, Apolipoprotein B, A1C, TSH w/Reflux: T4, Uric Acid    She is asking for these to be done right away and wants a call when they are in the system

## 2023-06-26 ENCOUNTER — OFFICE VISIT (OUTPATIENT)
Dept: INTERNAL MEDICINE | Facility: CLINIC | Age: 73
End: 2023-06-26
Payer: MEDICARE

## 2023-06-26 VITALS
HEART RATE: 86 BPM | OXYGEN SATURATION: 97 % | HEIGHT: 61 IN | SYSTOLIC BLOOD PRESSURE: 136 MMHG | RESPIRATION RATE: 16 BRPM | DIASTOLIC BLOOD PRESSURE: 76 MMHG | WEIGHT: 113.6 LBS | BODY MASS INDEX: 21.45 KG/M2

## 2023-06-26 DIAGNOSIS — K58.9 IRRITABLE BOWEL SYNDROME, UNSPECIFIED TYPE: ICD-10-CM

## 2023-06-26 DIAGNOSIS — Z86.0100 HX OF COLONIC POLYPS: ICD-10-CM

## 2023-06-26 DIAGNOSIS — M85.80 OSTEOPENIA, UNSPECIFIED LOCATION: ICD-10-CM

## 2023-06-26 DIAGNOSIS — E78.5 HYPERLIPIDEMIA, UNSPECIFIED HYPERLIPIDEMIA TYPE: ICD-10-CM

## 2023-06-26 DIAGNOSIS — E11.9 TYPE 2 DIABETES MELLITUS WITHOUT COMPLICATION, WITHOUT LONG-TERM CURRENT USE OF INSULIN (CMS/HCC): Primary | ICD-10-CM

## 2023-06-26 DIAGNOSIS — R00.2 HEART PALPITATIONS: Chronic | ICD-10-CM

## 2023-06-26 PROCEDURE — 99214 OFFICE O/P EST MOD 30 MIN: CPT | Performed by: INTERNAL MEDICINE

## 2023-06-26 ASSESSMENT — ENCOUNTER SYMPTOMS
DIARRHEA: 0
NERVOUS/ANXIOUS: 0
WOUND: 0
EYE PAIN: 0
WEAKNESS: 0
BLOOD IN STOOL: 0
HEMATURIA: 0
FATIGUE: 0
HEADACHES: 0
CONFUSION: 0
WHEEZING: 0
SHORTNESS OF BREATH: 0
UNEXPECTED WEIGHT CHANGE: 0
CONSTIPATION: 0
CHEST TIGHTNESS: 0
PALPITATIONS: 0
DYSURIA: 0
SINUS PRESSURE: 0
DIZZINESS: 0
ADENOPATHY: 0
ABDOMINAL PAIN: 0
MYALGIAS: 0
NAUSEA: 0
RHINORRHEA: 0
SLEEP DISTURBANCE: 0
FEVER: 0
CHILLS: 0
POLYDIPSIA: 0
SORE THROAT: 0
NECK PAIN: 0
FREQUENCY: 0
COUGH: 0
BACK PAIN: 0
VOMITING: 0

## 2023-06-26 ASSESSMENT — PATIENT HEALTH QUESTIONNAIRE - PHQ9: SUM OF ALL RESPONSES TO PHQ9 QUESTIONS 1 & 2: 0

## 2023-06-26 NOTE — PROGRESS NOTES
Subjective      Patient ID: Whitney Ordaz is a 73 y.o. female.    Here for annual exam. Hx reviewd. She has been gen well.  No new concerns. Walks for exercise. No chest symptoms. Weight stable. Compliant with meds.       The following have been reviewed and updated as appropriate in this visit:   Allergies  Meds  Problems       Review of Systems   Constitutional: Negative for chills, fatigue, fever and unexpected weight change.   HENT: Negative for congestion, ear pain, hearing loss, rhinorrhea, sinus pressure and sore throat.    Eyes: Negative for pain and visual disturbance.   Respiratory: Negative for cough, chest tightness, shortness of breath and wheezing.    Cardiovascular: Negative for chest pain, palpitations and leg swelling.   Gastrointestinal: Negative for abdominal pain, blood in stool, constipation, diarrhea, nausea and vomiting.   Endocrine: Negative for cold intolerance, heat intolerance and polydipsia.   Genitourinary: Negative for dysuria, frequency, hematuria and urgency.   Musculoskeletal: Negative for back pain, myalgias and neck pain.   Skin: Negative for rash and wound.   Allergic/Immunologic: Negative for environmental allergies and food allergies.   Neurological: Negative for dizziness, syncope, weakness and headaches.   Hematological: Negative for adenopathy.   Psychiatric/Behavioral: Negative for confusion and sleep disturbance. The patient is not nervous/anxious.          Current Outpatient Medications:   •  CHOLECALCIFEROL, VITAMIN D3, ORAL, Take by mouth daily., Disp: , Rfl:   •  diclofenac sodium (VOLTAREN) 1 % topical gel, Apply topically as needed for mild pain., Disp: , Rfl:   •  ezetimibe (ZETIA) 10 mg tablet, , Disp: , Rfl:   •  metFORMIN XR (GLUCOPHAGE-XR) 500 mg 24 hr tablet, TAKE 1 TABLET BY MOUTH EVERY DAY WITH BREAKFAST, Disp: 90 tablet, Rfl: 1  •  metoprolol succinate XL (TOPROL-XL) 25 mg 24 hr tablet, TAKE 1 TABLET BY MOUTH EVERY DAY, Disp: 90 tablet, Rfl:  "3    Allergies:  Atorvastatin, Rosuvastatin calcium, Sitagliptin, Lidocaine hcl, Glimepiride, Penicillins, Pioglitazone, and Procaine    Past Medical History:   Diagnosis Date   • Allergies    • Arthritis    • Diabetes (CMS/[facility])    • DM2 (diabetes mellitus, type 2) (CMS/[facility]) 12/1/2020   • Hand arthritis 12/15/2021   • Hemorrhoids    • History of partial hysterectomy 12/1/2020   • Hx of appendectomy 12/1/2020   • Hx of colonic polyps 12/1/2020   • Hx of tonsillectomy 12/1/2020   • Hyperlipidemia 12/1/2020   • IBS (irritable bowel syndrome) 12/19/2022   • Lipid disorder    • Osteoarthritis of right knee 12/1/2020   • Osteopenia 7/28/2022   • Seasonal allergies 12/1/2020   • Vitamin D deficiency 6/14/2021       Past Surgical History:   Procedure Laterality Date   • APPENDECTOMY     • HYSTERECTOMY     • TONSILLECTOMY         Social History     Tobacco Use   • Smoking status: Never   • Smokeless tobacco: Never   Vaping Use   • Vaping Use: Never used   Substance Use Topics   • Alcohol use: Not Currently   • Drug use: Never       Objective     Vitals:    06/26/23 1106   BP: 136/76   Pulse: 86   Resp: 16   SpO2: 97%   Weight: 51.5 kg (113 lb 9.6 oz)   Height: 1.549 m (5' 1\")       Physical Exam  Constitutional:       Appearance: She is well-developed.   HENT:      Head: Normocephalic and atraumatic.      Right Ear: External ear normal.      Left Ear: External ear normal.      Nose: Nose normal.   Eyes:      Conjunctiva/sclera: Conjunctivae normal.      Pupils: Pupils are equal, round, and reactive to light.   Neck:      Vascular: No carotid bruit.   Cardiovascular:      Rate and Rhythm: Normal rate and regular rhythm.      Heart sounds: Normal heart sounds. No murmur heard.  Pulmonary:      Effort: Pulmonary effort is normal.      Breath sounds: Normal breath sounds. No wheezing.   Abdominal:      General: Bowel sounds are normal.      Palpations: Abdomen is soft.      Tenderness: There is no abdominal tenderness. "   Musculoskeletal:         General: No deformity.      Cervical back: Neck supple.   Skin:     General: Skin is warm and dry.      Findings: No rash.   Neurological:      Mental Status: She is alert and oriented to person, place, and time.   Psychiatric:         Mood and Affect: Mood is not depressed.         Behavior: Behavior normal.         Thought Content: Thought content normal.         Assessment/Plan   Type 2 diabetes mellitus without complication, without long-term current use of insulin (CMS/[facility])  (primary encounter diagnosis)  Comment: controlled; cont med, reg exercise, healthy diet; optho per routine    Hyperlipidemia, unspecified hyperlipidemia type  Comment: she cant take statins; dr gonzalez is gonna try leqvio    Heart palpitations  Comment: she is doing well on metoprolol; no chest symptoms    Irritable bowel syndrome, unspecified type  Comment: stable    Hx of colonic polyps  Comment: colo 2022 with dr live; f/u 3 yrs    Osteopenia, unspecified location  Comment: dexa last yr at [place]; cont healthy diet and reg exercise    HM  Pt appears well; labs reviewed; disc rec vaccines; mammo and dexa done; f/u 6 months          Porfirio Soto MD    6/26/2023

## 2023-08-04 ENCOUNTER — OFFICE VISIT (OUTPATIENT)
Dept: INTERNAL MEDICINE | Facility: CLINIC | Age: 73
End: 2023-08-04
Payer: MEDICARE

## 2023-08-04 VITALS
DIASTOLIC BLOOD PRESSURE: 66 MMHG | HEIGHT: 61 IN | HEART RATE: 61 BPM | SYSTOLIC BLOOD PRESSURE: 118 MMHG | BODY MASS INDEX: 21.67 KG/M2 | RESPIRATION RATE: 16 BRPM | WEIGHT: 114.8 LBS | OXYGEN SATURATION: 98 %

## 2023-08-04 DIAGNOSIS — E78.5 HYPERLIPIDEMIA, UNSPECIFIED HYPERLIPIDEMIA TYPE: ICD-10-CM

## 2023-08-04 DIAGNOSIS — R05.9 COUGH, UNSPECIFIED TYPE: Primary | ICD-10-CM

## 2023-08-04 LAB
FLUAV RNA SPEC QL NAA+PROBE: NEGATIVE
FLUBV RNA SPEC QL NAA+PROBE: NEGATIVE
RSV RNA SPEC QL NAA+PROBE: NEGATIVE
SARS-COV-2 RNA RESP QL NAA+PROBE: NEGATIVE

## 2023-08-04 PROCEDURE — 99214 OFFICE O/P EST MOD 30 MIN: CPT | Performed by: INTERNAL MEDICINE

## 2023-08-04 PROCEDURE — 87637 SARSCOV2&INF A&B&RSV AMP PRB: CPT | Performed by: INTERNAL MEDICINE

## 2023-08-04 RX ORDER — BENZONATATE 200 MG/1
200 CAPSULE ORAL 3 TIMES DAILY PRN
Qty: 30 CAPSULE | Refills: 0 | Status: SHIPPED | OUTPATIENT
Start: 2023-08-04 | End: 2023-08-14

## 2023-08-04 RX ORDER — INCLISIRAN 284 MG/1.5ML
284 INJECTION, SOLUTION SUBCUTANEOUS ONCE
Qty: 0.01 ML | Refills: 0
Start: 2023-08-04 | End: 2023-08-04

## 2023-08-04 ASSESSMENT — ENCOUNTER SYMPTOMS
COUGH: 1
SHORTNESS OF BREATH: 0
EYE PAIN: 0
HEMATURIA: 0
SLEEP DISTURBANCE: 0
WHEEZING: 0
CONFUSION: 0
UNEXPECTED WEIGHT CHANGE: 0
ABDOMINAL PAIN: 0
ADENOPATHY: 0
SINUS PRESSURE: 0
PALPITATIONS: 0
VOMITING: 0
POLYDIPSIA: 0
MYALGIAS: 0
DIZZINESS: 0
NAUSEA: 0
BACK PAIN: 0
FREQUENCY: 0
CONSTIPATION: 0
DIARRHEA: 0
WOUND: 0
NECK PAIN: 0
FEVER: 0
DYSURIA: 0
BLOOD IN STOOL: 0
RHINORRHEA: 0
SORE THROAT: 0
NERVOUS/ANXIOUS: 0
WEAKNESS: 0
CHILLS: 0
HEADACHES: 0
CHEST TIGHTNESS: 0
FATIGUE: 0

## 2023-08-04 NOTE — PROGRESS NOTES
Subjective      Patient ID: Whitney Ordaz is a 73 y.o. female.    Pt c/o cough x 2 weeks. She has allergies but this is worse. Assoc head congestion. No fevers, cp or sob. Not taking anything for it.       The following have been reviewed and updated as appropriate in this visit:   Allergies  Meds  Problems       Review of Systems   Constitutional: Negative for chills, fatigue, fever and unexpected weight change.   HENT: Positive for congestion. Negative for ear pain, hearing loss, rhinorrhea, sinus pressure and sore throat.    Eyes: Negative for pain and visual disturbance.   Respiratory: Positive for cough. Negative for chest tightness, shortness of breath and wheezing.    Cardiovascular: Negative for chest pain, palpitations and leg swelling.   Gastrointestinal: Negative for abdominal pain, blood in stool, constipation, diarrhea, nausea and vomiting.   Endocrine: Negative for cold intolerance, heat intolerance and polydipsia.   Genitourinary: Negative for dysuria, frequency, hematuria and urgency.   Musculoskeletal: Negative for back pain, myalgias and neck pain.   Skin: Negative for rash and wound.   Allergic/Immunologic: Negative for environmental allergies and food allergies.   Neurological: Negative for dizziness, syncope, weakness and headaches.   Hematological: Negative for adenopathy.   Psychiatric/Behavioral: Negative for confusion and sleep disturbance. The patient is not nervous/anxious.          Current Outpatient Medications:   •  benzonatate (TESSALON) 200 mg capsule, Take 1 capsule (200 mg total) by mouth 3 (three) times a day as needed for cough for up to 10 days., Disp: 30 capsule, Rfl: 0  •  CHOLECALCIFEROL, VITAMIN D3, ORAL, Take by mouth daily., Disp: , Rfl:   •  diclofenac sodium (VOLTAREN) 1 % topical gel, Apply topically as needed for mild pain., Disp: , Rfl:   •  ezetimibe (ZETIA) 10 mg tablet, , Disp: , Rfl:   •  inclisiran (LEQVIO) 284 mg/1.5 mL syringe, Inject 1.5 mL (284 mg  "total) under the skin once for 1 dose., Disp: 0.01 mL, Rfl: 0  •  metFORMIN XR (GLUCOPHAGE-XR) 500 mg 24 hr tablet, TAKE 1 TABLET BY MOUTH EVERY DAY WITH BREAKFAST, Disp: 90 tablet, Rfl: 1  •  metoprolol succinate XL (TOPROL-XL) 25 mg 24 hr tablet, TAKE 1 TABLET BY MOUTH EVERY DAY, Disp: 90 tablet, Rfl: 3    Allergies:  Atorvastatin, Rosuvastatin calcium, Sitagliptin, Lidocaine hcl, Glimepiride, Penicillins, Pioglitazone, and Procaine    Past Medical History:   Diagnosis Date   • Allergies    • Arthritis    • Diabetes (CMS/[facility])    • DM2 (diabetes mellitus, type 2) (CMS/[facility]) 12/1/2020   • Hand arthritis 12/15/2021   • Hemorrhoids    • History of partial hysterectomy 12/1/2020   • Hx of appendectomy 12/1/2020   • Hx of colonic polyps 12/1/2020   • Hx of tonsillectomy 12/1/2020   • Hyperlipidemia 12/1/2020   • IBS (irritable bowel syndrome) 12/19/2022   • Lipid disorder    • Osteoarthritis of right knee 12/1/2020   • Osteopenia 7/28/2022   • Seasonal allergies 12/1/2020   • Vitamin D deficiency 6/14/2021       Past Surgical History:   Procedure Laterality Date   • APPENDECTOMY     • HYSTERECTOMY     • TONSILLECTOMY         Social History     Tobacco Use   • Smoking status: Never   • Smokeless tobacco: Never   Vaping Use   • Vaping Use: Never used   Substance Use Topics   • Alcohol use: Not Currently   • Drug use: Never       Objective     Vitals:    08/04/23 1040   BP: 118/66   Pulse: 61   Resp: 16   SpO2: 98%   Weight: 52.1 kg (114 lb 12.8 oz)   Height: 1.549 m (5' 1\")       Physical Exam  Constitutional:       Appearance: She is well-developed.   HENT:      Head: Normocephalic and atraumatic.      Right Ear: External ear normal.      Left Ear: External ear normal.      Nose: Nose normal.   Eyes:      Conjunctiva/sclera: Conjunctivae normal.      Pupils: Pupils are equal, round, and reactive to light.   Cardiovascular:      Rate and Rhythm: Normal rate and regular rhythm.      Heart sounds: Normal heart sounds. No " murmur heard.  Pulmonary:      Effort: Pulmonary effort is normal.      Breath sounds: Normal breath sounds. No wheezing.   Abdominal:      Palpations: Abdomen is soft.   Musculoskeletal:         General: No deformity.      Cervical back: Neck supple.   Skin:     General: Skin is warm and dry.      Findings: No rash.   Neurological:      Mental Status: She is alert and oriented to person, place, and time.   Psychiatric:         Mood and Affect: Mood is not depressed.         Behavior: Behavior normal.         Thought Content: Thought content normal.         Assessment/Plan   Cough, unspecified type  (primary encounter diagnosis)  Comment: pt appears well; lungs clear; suspect a viral uri; test for covid/rsv; rx tessalon; aslo take mucienx-dm; f/u precautions discussed  Plan: COVID- 19 PCR Symptomatic (includes FLU A/B &         RSV) - [facility]    Hyperlipidemia, unspecified hyperlipidemia type  Comment: she is not on leqvio under care of dr gonzalez but started this well before thsi cough started          Porfirio Soto MD    8/4/2023

## 2023-09-26 ENCOUNTER — TRANSCRIBE ORDERS (OUTPATIENT)
Dept: SCHEDULING | Age: 73
End: 2023-09-26

## 2023-09-26 ENCOUNTER — APPOINTMENT (OUTPATIENT)
Dept: LAB | Facility: HOSPITAL | Age: 73
End: 2023-09-26
Attending: INTERNAL MEDICINE
Payer: MEDICARE

## 2023-09-26 DIAGNOSIS — E78.5 HYPERLIPIDEMIA, UNSPECIFIED: ICD-10-CM

## 2023-09-26 DIAGNOSIS — E78.5 HYPERLIPIDEMIA, UNSPECIFIED: Primary | ICD-10-CM

## 2023-09-26 LAB
ALBUMIN SERPL-MCNC: 4.6 G/DL (ref 3.5–5.7)
ALP SERPL-CCNC: 59 IU/L (ref 34–125)
ALT SERPL-CCNC: 24 IU/L (ref 7–52)
ANION GAP SERPL CALC-SCNC: 8 MEQ/L (ref 3–15)
AST SERPL-CCNC: 22 IU/L (ref 13–39)
BILIRUB SERPL-MCNC: 0.6 MG/DL (ref 0.3–1.2)
BUN SERPL-MCNC: 21 MG/DL (ref 7–25)
CALCIUM SERPL-MCNC: 9.7 MG/DL (ref 8.6–10.3)
CHLORIDE SERPL-SCNC: 104 MEQ/L (ref 98–107)
CHOLEST SERPL-MCNC: 163 MG/DL
CO2 SERPL-SCNC: 28 MEQ/L (ref 21–31)
CREAT SERPL-MCNC: 0.8 MG/DL (ref 0.6–1.2)
EST. AVERAGE GLUCOSE BLD GHB EST-MCNC: 169 MG/DL
GFR SERPL CREATININE-BSD FRML MDRD: >60 ML/MIN/1.73M*2
GLUCOSE SERPL-MCNC: 134 MG/DL (ref 70–99)
HBA1C MFR BLD: 7.5 %
HDLC SERPL-MCNC: 52 MG/DL
HDLC SERPL: 3.1 {RATIO}
LDLC SERPL CALC-MCNC: 76 MG/DL
NONHDLC SERPL-MCNC: 111 MG/DL
POTASSIUM SERPL-SCNC: 4.6 MEQ/L (ref 3.5–5.1)
PROT SERPL-MCNC: 7 G/DL (ref 6–8.2)
SODIUM SERPL-SCNC: 140 MEQ/L (ref 136–145)
TRIGL SERPL-MCNC: 176 MG/DL

## 2023-09-26 PROCEDURE — 80053 COMPREHEN METABOLIC PANEL: CPT

## 2023-09-26 PROCEDURE — 83036 HEMOGLOBIN GLYCOSYLATED A1C: CPT | Mod: GA

## 2023-09-26 PROCEDURE — 80061 LIPID PANEL: CPT

## 2023-09-26 PROCEDURE — 36415 COLL VENOUS BLD VENIPUNCTURE: CPT

## 2023-09-28 ENCOUNTER — TELEPHONE (OUTPATIENT)
Dept: INTERNAL MEDICINE | Facility: CLINIC | Age: 73
End: 2023-09-28
Payer: MEDICARE

## 2023-09-28 RX ORDER — METFORMIN HYDROCHLORIDE 500 MG/1
TABLET, EXTENDED RELEASE ORAL
Qty: 180 TABLET | Refills: 1 | Status: SHIPPED | OUTPATIENT
Start: 2023-09-28 | End: 2024-02-13

## 2023-09-28 NOTE — TELEPHONE ENCOUNTER
----- Message from Porfirio Soto MD sent at 9/28/2023  8:58 AM EDT -----  Regarding: FW: Increase in Metformin  Contact: [phone]        ----- Message -----  From: Sabrina Buckley RN  Sent: 9/28/2023   8:53 AM EDT  To: Porfirio Soto MD  Subject: FW: Increase in Metformin                        Dr. Soto, please verify dose increase to metformin 1000mg daily?   ----- Message -----  From: Whitney Shepherd  Sent: 9/27/2023   5:44 PM EDT  To:  [facility]  Subject: Increase in Metformin                            Dr. Soto:  I am currently on a 500 MG tablet of Metformin  HCL ER but it doesn't seem to be lowering my  sugars as previously.  Unfortunately, you may decide that I need to add another pill in the evening.    I have 47 pills left and no more refills.  Therefore, I need a new script for 180 pills  in the next script plus 40 more to finish out the current script.  Thank you.  Whitney Prince

## 2023-10-03 ENCOUNTER — TELEPHONE (OUTPATIENT)
Dept: INTERNAL MEDICINE | Facility: CLINIC | Age: 73
End: 2023-10-03
Payer: MEDICARE

## 2023-10-03 RX ORDER — METOPROLOL SUCCINATE 25 MG/1
25 TABLET, EXTENDED RELEASE ORAL DAILY
Qty: 90 TABLET | Refills: 1 | Status: SHIPPED | OUTPATIENT
Start: 2023-10-03 | End: 2023-10-19 | Stop reason: SDUPTHER

## 2023-10-03 NOTE — TELEPHONE ENCOUNTER
----- Message from Whitney Ordaz sent at 10/3/2023 11:28 AM EDT -----  Regarding: Metoprolol  Contact: [phone]  Hi Dr. Soto,  Thank you for filling my metformin request.  I need a new script for metoprolol and will be out of it in 20    days with no refill.  I have been in contact with Dr. Woodruff's nurse who asked if you would be able to fill it.  I have done well on the medication and hope to continue on it but I am requesting a heart monitor once  again as I have experienced some palpitations since  my blood sugars have increased.  Trying to get an appt. for heart monitor but in the meantime  need the medication refill.  Thank you.  Whitney Prince

## 2023-10-13 NOTE — PROGRESS NOTES
Electrophysiology         Reason for visit: PVCs  Referred by: Dr Madison     History of Present Illness:    Patient is a 71-year-old woman with past medical history of diabetes, elevated coronary calcium score and PVCs who was referred to our office for management of PVCs.    Patient has been having palpitations since childhood. However recently started having worsening of palpitations. She describes them as skipped beats which lasts for 1 to 2 hours before they subside. Not associated with lightheadedness, syncope or presyncope.  She was seen by Dr. Madison and she had a Holter monitor which showed infrequent symptomatic PVCs (burden 1.3%).  She was originally referred to us for management of PVCs.    She was last seen in clinic 9/2021 - at which time she was noted to have infrequent PVC and was started on metoprolol succinate 25 mg daily with extra dose PRN. She presents today for follow-up. She reports very infrequent palpitations - episodes 1-2 times a year. She reports that she is satisfied with her current regimen and feels she has done well, has not ever required as needed dose. She reports compliance with medications including metoprolol succinate.     She reports no change in her exercise tolerance, no lower extremity edema, no shortness of breath. No presyncope/syncopal events.     A comprehensive 15-point review of systems was performed and was negative unless specifically mentioned in the History of Present Illness.        Past Medical History:   Diagnosis Date    Allergies     Arthritis     Diabetes (CMS/[facility])     DM2 (diabetes mellitus, type 2) (CMS/[facility]) 12/1/2020    Hand arthritis 12/15/2021    Hemorrhoids     History of partial hysterectomy 12/1/2020    Hx of appendectomy 12/1/2020    Hx of colonic polyps 12/1/2020    Hx of tonsillectomy 12/1/2020    Hyperlipidemia 12/1/2020    IBS (irritable bowel syndrome) 12/19/2022    Lipid disorder     Osteoarthritis of right knee 12/1/2020     Osteopenia 7/28/2022    Seasonal allergies 12/1/2020    Vitamin D deficiency 6/14/2021       Past Surgical History:   Procedure Laterality Date    APPENDECTOMY      HYSTERECTOMY      TONSILLECTOMY         Current Outpatient Medications on File Prior to Visit   Medication Sig Dispense Refill    CHOLECALCIFEROL, VITAMIN D3, ORAL Take by mouth daily.      diclofenac sodium (VOLTAREN) 1 % topical gel Apply topically as needed for mild pain.      ezetimibe (ZETIA) 10 mg tablet       inclisiran 284 mg/1.5 mL syringe Inject under the skin every 6 (six) months.      metFORMIN XR (GLUCOPHAGE-XR) 500 mg 24 hr tablet Take 1 tab  tablet 1     No current facility-administered medications on file prior to visit.       Allergies, Social History and Family History were reviewed and updated in EMR.     Physical Examination:  Vitals:    10/19/23 0935   BP: (!) 158/74     Constitutional: The patient appeared physically well and in no acute distress.  Respiratory: Clear to auscultation, no wheezes or rubs.  Cardiovascular: A comprehensive cardiovascular examination was performed. Highlights include normal jugular venous pressure, 2+ carotids, no bruits. The precordium is quiet. Regular rhythm, rate, S1 and S2 normal, no rubs, or gallops were appreciated. Murmurs: No pathologic murmurs appreciated.  Musculoskeletal/ Extremities: No edema, normal peripheral pulses.  Skin: No rashes or lesions apparent.       Lab Results   Component Value Date    WBC 8.97 12/14/2022    HGB 13.2 12/14/2022    HCT 39.7 12/14/2022     12/14/2022    CHOL 163 09/26/2023    TRIG 176 (H) 09/26/2023    HDL 52 09/26/2023    LDLCALC 76 09/26/2023    ALT 24 09/26/2023    AST 22 09/26/2023     09/26/2023    K 4.6 09/26/2023    CREATININE 0.8 09/26/2023    BUN 21 09/26/2023    TSH 1.82 12/14/2022     ECG 10/19/2023: NSR   Previous EKG without evidence of recorded PVC    Holter 9/29/2021:  1. The patient was monitored for 48 hours.  2. The  predominant rhythm was sinus rhythm.  3. The average heart rate was 79 bpm.  4. The minimum heart rate was 61 bpm.  5. The maximum heart rate was 119 bpm.  6. There was 1 supraventricular beat.  7. There were 2834 premature ventricular beats(1.3%).  8. Patient logged symptoms of palpitations and sometimes tightnes around sternum area. The ECG showed sinus rhythm with PVCs.  10/7/2021     Assessment and plan:  Patient is a 71-year-old woman with past medical history of hypertension, hyperlipidemia, diabetes, elevated coronary calcium score and PVCs who was referred to our office for management of PVCs.    #palpitations  #PVCs  - She has very infrequent PVCs on most recent holter in 2019. Overall feels her symptoms are well controlled on metoprolol succinate 25 mg daily with PRN extra dose - she has not needed the PRN dose ever.   Plan:   - continue metoprolol 25 mg daily with PRN extra metoprolol as needed for management of symptomatic palpitations/PVC.  - follow-up PRN. She will continue to follow-up regularly with her PCP.    #HTN  - reports history of white coat hypertension, reports home BP are within normal range. She is not chronically maintained on antihypertensives. This is followed by her PCP.        I spent 35 minutes with the patient for record review, examination, care coordination and counseling.     This letter was generated using speech recognition software. Please excuse any typographical errors.       Amador Woodruff MD, [facility]  Cardiac Electrophysiology  [facility]  10/19/2023

## 2023-10-19 ENCOUNTER — OFFICE VISIT (OUTPATIENT)
Dept: CARDIOLOGY | Facility: CLINIC | Age: 73
End: 2023-10-19
Payer: MEDICARE

## 2023-10-19 VITALS
WEIGHT: 110 LBS | DIASTOLIC BLOOD PRESSURE: 74 MMHG | SYSTOLIC BLOOD PRESSURE: 158 MMHG | BODY MASS INDEX: 20.77 KG/M2 | HEIGHT: 61 IN

## 2023-10-19 DIAGNOSIS — R00.2 HEART PALPITATIONS: Primary | Chronic | ICD-10-CM

## 2023-10-19 PROCEDURE — 93000 ELECTROCARDIOGRAM COMPLETE: CPT | Performed by: INTERNAL MEDICINE

## 2023-10-19 PROCEDURE — 99214 OFFICE O/P EST MOD 30 MIN: CPT | Performed by: INTERNAL MEDICINE

## 2023-10-19 RX ORDER — METOPROLOL SUCCINATE 25 MG/1
25 TABLET, EXTENDED RELEASE ORAL DAILY
Qty: 90 TABLET | Refills: 3 | Status: SHIPPED | OUTPATIENT
Start: 2023-10-19 | End: 2025-01-13 | Stop reason: SDUPTHER

## 2023-10-19 NOTE — LETTER
October 19, 2023     Jorge Alberto Madison MD  [address] [facility]  [address]  [place] [address]    Patient: Whitney Ordaz  YOB: 1950  Date of Visit: 10/19/2023      Dear Dr. Madison:    Thank you for referring Whitney Ordaz to me for evaluation. Below are my notes for this consultation.    If you have questions, please do not hesitate to call me. I look forward to following your patient along with you.         Sincerely,        Amador Woodruff MD        CC: MD Ranjan Gautam Ali R, MD  10/19/2023 10:39 AM  Signed       Electrophysiology         Reason for visit: PVCs  Referred by: Dr Madison     History of Present Illness:    Patient is a 71-year-old woman with past medical history of diabetes, elevated coronary calcium score and PVCs who was referred to our office for management of PVCs.    Patient has been having palpitations since childhood. However recently started having worsening of palpitations. She describes them as skipped beats which lasts for 1 to 2 hours before they subside. Not associated with lightheadedness, syncope or presyncope.  She was seen by Dr. Madison and she had a Holter monitor which showed infrequent symptomatic PVCs (burden 1.3%).  She was originally referred to us for management of PVCs.    She was last seen in clinic 9/2021 - at which time she was noted to have infrequent PVC and was started on metoprolol succinate 25 mg daily with extra dose PRN. She presents today for follow-up. She reports very infrequent palpitations - episodes 1-2 times a year. She reports that she is satisfied with her current regimen and feels she has done well, has not ever required as needed dose. She reports compliance with medications including metoprolol succinate.     She reports no change in her exercise tolerance, no lower extremity edema, no shortness of breath. No presyncope/syncopal events.     A comprehensive 15-point review of systems was performed  and was negative unless specifically mentioned in the History of Present Illness.        Past Medical History:   Diagnosis Date    Allergies     Arthritis     Diabetes (CMS/[facility])     DM2 (diabetes mellitus, type 2) (CMS/HCC) 12/1/2020    Hand arthritis 12/15/2021    Hemorrhoids     History of partial hysterectomy 12/1/2020    Hx of appendectomy 12/1/2020    Hx of colonic polyps 12/1/2020    Hx of tonsillectomy 12/1/2020    Hyperlipidemia 12/1/2020    IBS (irritable bowel syndrome) 12/19/2022    Lipid disorder     Osteoarthritis of right knee 12/1/2020    Osteopenia 7/28/2022    Seasonal allergies 12/1/2020    Vitamin D deficiency 6/14/2021       Past Surgical History:   Procedure Laterality Date    APPENDECTOMY      HYSTERECTOMY      TONSILLECTOMY         Current Outpatient Medications on File Prior to Visit   Medication Sig Dispense Refill    CHOLECALCIFEROL, VITAMIN D3, ORAL Take by mouth daily.      diclofenac sodium (VOLTAREN) 1 % topical gel Apply topically as needed for mild pain.      ezetimibe (ZETIA) 10 mg tablet       inclisiran 284 mg/1.5 mL syringe Inject under the skin every 6 (six) months.      metFORMIN XR (GLUCOPHAGE-XR) 500 mg 24 hr tablet Take 1 tab  tablet 1     No current facility-administered medications on file prior to visit.       Allergies, Social History and Family History were reviewed and updated in EMR.     Physical Examination:  Vitals:    10/19/23 0935   BP: (!) 158/74     Constitutional: The patient appeared physically well and in no acute distress.  Respiratory: Clear to auscultation, no wheezes or rubs.  Cardiovascular: A comprehensive cardiovascular examination was performed. Highlights include normal jugular venous pressure, 2+ carotids, no bruits. The precordium is quiet. Regular rhythm, rate, S1 and S2 normal, no rubs, or gallops were appreciated. Murmurs: No pathologic murmurs appreciated.  Musculoskeletal/ Extremities: No edema, normal peripheral  pulses.  Skin: No rashes or lesions apparent.       Lab Results   Component Value Date    WBC 8.97 12/14/2022    HGB 13.2 12/14/2022    HCT 39.7 12/14/2022     12/14/2022    CHOL 163 09/26/2023    TRIG 176 (H) 09/26/2023    HDL 52 09/26/2023    LDLCALC 76 09/26/2023    ALT 24 09/26/2023    AST 22 09/26/2023     09/26/2023    K 4.6 09/26/2023    CREATININE 0.8 09/26/2023    BUN 21 09/26/2023    TSH 1.82 12/14/2022     ECG 10/19/2023: NSR   Previous EKG without evidence of recorded PVC    Holter 9/29/2021:  1. The patient was monitored for 48 hours.  2. The predominant rhythm was sinus rhythm.  3. The average heart rate was 79 bpm.  4. The minimum heart rate was 61 bpm.  5. The maximum heart rate was 119 bpm.  6. There was 1 supraventricular beat.  7. There were 2834 premature ventricular beats(1.3%).  8. Patient logged symptoms of palpitations and sometimes tightnes around sternum area. The ECG showed sinus rhythm with PVCs.  10/7/2021     Assessment and plan:  Patient is a 71-year-old woman with past medical history of hypertension, hyperlipidemia, diabetes, elevated coronary calcium score and PVCs who was referred to our office for management of PVCs.    #palpitations  #PVCs  - She has very infrequent PVCs on most recent holter in 2019. Overall feels her symptoms are well controlled on metoprolol succinate 25 mg daily with PRN extra dose - she has not needed the PRN dose ever.   Plan:   - continue metoprolol 25 mg daily with PRN extra metoprolol as needed for management of symptomatic palpitations/PVC.  - follow-up PRN. She will continue to follow-up regularly with her PCP.    #HTN  - reports history of white coat hypertension, reports home BP are within normal range. She is not chronically maintained on antihypertensives. This is followed by her PCP.        I spent 35 minutes with the patient for record review, examination, care coordination and counseling.     This letter was generated using speech  recognition software. Please excuse any typographical errors.       Amador Woodruff MD, [facility]  Cardiac Electrophysiology  [facility]  10/19/2023

## 2023-12-04 ENCOUNTER — TELEPHONE (OUTPATIENT)
Dept: INTERNAL MEDICINE | Facility: CLINIC | Age: 73
End: 2023-12-04
Payer: MEDICARE

## 2023-12-04 DIAGNOSIS — E55.9 VITAMIN D DEFICIENCY: ICD-10-CM

## 2023-12-04 DIAGNOSIS — R73.09 ELEVATED GLUCOSE: ICD-10-CM

## 2023-12-04 DIAGNOSIS — E11.9 TYPE 2 DIABETES MELLITUS WITHOUT COMPLICATION, WITHOUT LONG-TERM CURRENT USE OF INSULIN (CMS/HCC): ICD-10-CM

## 2023-12-04 DIAGNOSIS — E78.5 HYPERLIPIDEMIA, UNSPECIFIED HYPERLIPIDEMIA TYPE: ICD-10-CM

## 2023-12-04 DIAGNOSIS — Z00.00 HEALTH CARE MAINTENANCE: Primary | ICD-10-CM

## 2023-12-04 NOTE — TELEPHONE ENCOUNTER
----- Message from Porfirio Soto MD sent at 12/4/2023 11:53 AM EST -----  Regarding: FW: Current Blood draw script  Contact: [phone]        ----- Message -----  From: Sabrina Buckley RN  Sent: 12/4/2023  11:50 AM EST  To: Porfirio Soto MD  Subject: FW: Current Blood draw script                    Would you like orders placed in for CBC,CMP, Lipid, A1C,TSH 3rd Gen and Vit D?         ----- Message -----  From: Whitney Shepherd  Sent: 12/4/2023  11:23 AM EST  To: [facility]  Subject: Current Blood draw script                        Please order comprehensive blood draw prior to my   office visit on Monday 12/11/23.  I assume I should  fast as well.    Thank you, Whitney Prince

## 2023-12-06 ENCOUNTER — APPOINTMENT (OUTPATIENT)
Dept: LAB | Facility: HOSPITAL | Age: 73
End: 2023-12-06
Attending: INTERNAL MEDICINE
Payer: MEDICARE

## 2023-12-06 DIAGNOSIS — E55.9 VITAMIN D DEFICIENCY: ICD-10-CM

## 2023-12-06 DIAGNOSIS — R73.09 ELEVATED GLUCOSE: ICD-10-CM

## 2023-12-06 DIAGNOSIS — E11.9 TYPE 2 DIABETES MELLITUS WITHOUT COMPLICATION, WITHOUT LONG-TERM CURRENT USE OF INSULIN (CMS/HCC): ICD-10-CM

## 2023-12-06 DIAGNOSIS — Z00.00 HEALTH CARE MAINTENANCE: ICD-10-CM

## 2023-12-06 DIAGNOSIS — E78.5 HYPERLIPIDEMIA, UNSPECIFIED HYPERLIPIDEMIA TYPE: ICD-10-CM

## 2023-12-06 LAB
25(OH)D3 SERPL-MCNC: 32 NG/ML (ref 30–100)
ALBUMIN SERPL-MCNC: 4.4 G/DL (ref 3.5–5.7)
ALP SERPL-CCNC: 55 IU/L (ref 34–125)
ALT SERPL-CCNC: 21 IU/L (ref 7–52)
ANION GAP SERPL CALC-SCNC: 12 MEQ/L (ref 3–15)
AST SERPL-CCNC: 18 IU/L (ref 13–39)
BASOPHILS # BLD: 0.07 K/UL (ref 0.01–0.1)
BASOPHILS NFR BLD: 1 %
BILIRUB SERPL-MCNC: 0.4 MG/DL (ref 0.3–1.2)
BUN SERPL-MCNC: 16 MG/DL (ref 7–25)
CALCIUM SERPL-MCNC: 9.7 MG/DL (ref 8.6–10.3)
CHLORIDE SERPL-SCNC: 103 MEQ/L (ref 98–107)
CHOLEST SERPL-MCNC: 155 MG/DL
CO2 SERPL-SCNC: 25 MEQ/L (ref 21–31)
CREAT SERPL-MCNC: 0.8 MG/DL (ref 0.6–1.2)
CREAT UR-MCNC: 55.1 MG/DL
EGFRCR SERPLBLD CKD-EPI 2021: >60 ML/MIN/1.73M*2
EOSINOPHIL # BLD: 0.22 K/UL (ref 0.04–0.36)
EOSINOPHIL NFR BLD: 3 %
ERYTHROCYTE [DISTWIDTH] IN BLOOD BY AUTOMATED COUNT: 12.7 % (ref 11.7–14.4)
EST. AVERAGE GLUCOSE BLD GHB EST-MCNC: 154 MG/DL
GLUCOSE SERPL-MCNC: 129 MG/DL (ref 70–99)
HBA1C MFR BLD: 7 %
HCT VFR BLDCO AUTO: 39.5 % (ref 35–45)
HDLC SERPL-MCNC: 47 MG/DL
HDLC SERPL: 3.3 {RATIO}
HGB BLD-MCNC: 12.9 G/DL (ref 11.8–15.7)
LDLC SERPL CALC-MCNC: 64 MG/DL
LYMPHOCYTES # BLD: 2.66 K/UL (ref 1.2–3.5)
LYMPHOCYTES NFR BLD: 37 %
MCH RBC QN AUTO: 29.6 PG (ref 28–33.2)
MCHC RBC AUTO-ENTMCNC: 32.7 G/DL (ref 32.2–35.5)
MCV RBC AUTO: 90.6 FL (ref 83–98)
MICROALBUMIN UR-MCNC: 2.1 MG/L
MICROALBUMIN/CREAT UR: 3.8 UG/MG
MONOCYTES # BLD: 0.5 K/UL (ref 0.28–0.8)
MONOCYTES NFR BLD: 7 %
NEUTS BAND # BLD: 0.07 K/UL (ref 0–0.53)
NEUTS BAND # BLD: 3.66 K/UL (ref 1.7–7)
NEUTS BAND NFR BLD: 1 %
NEUTS SEG NFR BLD: 51 %
NONHDLC SERPL-MCNC: 108 MG/DL
PDW BLD AUTO: 9.7 FL (ref 9.4–12.3)
PLAT MORPH BLD: NORMAL
PLATELET # BLD AUTO: 289 K/UL (ref 150–369)
PLATELET # BLD EST: NORMAL 10*3/UL
POLYCHROMASIA BLD QL SMEAR: NORMAL
POTASSIUM SERPL-SCNC: 4.4 MEQ/L (ref 3.5–5.1)
PROT SERPL-MCNC: 6.8 G/DL (ref 6–8.2)
RBC # BLD AUTO: 4.36 M/UL (ref 3.93–5.22)
SODIUM SERPL-SCNC: 140 MEQ/L (ref 136–145)
TRIGL SERPL-MCNC: 221 MG/DL
TSH SERPL DL<=0.05 MIU/L-ACNC: 1.92 MIU/L (ref 0.34–5.6)
WBC # BLD AUTO: 7.18 K/UL (ref 3.8–10.5)

## 2023-12-06 PROCEDURE — 82306 VITAMIN D 25 HYDROXY: CPT

## 2023-12-06 PROCEDURE — 83036 HEMOGLOBIN GLYCOSYLATED A1C: CPT

## 2023-12-06 PROCEDURE — 82043 UR ALBUMIN QUANTITATIVE: CPT

## 2023-12-06 PROCEDURE — 80053 COMPREHEN METABOLIC PANEL: CPT

## 2023-12-06 PROCEDURE — 84443 ASSAY THYROID STIM HORMONE: CPT

## 2023-12-06 PROCEDURE — 85025 COMPLETE CBC W/AUTO DIFF WBC: CPT

## 2023-12-06 PROCEDURE — 36415 COLL VENOUS BLD VENIPUNCTURE: CPT

## 2023-12-06 PROCEDURE — 80061 LIPID PANEL: CPT

## 2023-12-07 ENCOUNTER — TELEPHONE (OUTPATIENT)
Dept: INTERNAL MEDICINE | Facility: CLINIC | Age: 73
End: 2023-12-07
Payer: MEDICARE

## 2023-12-11 ENCOUNTER — OFFICE VISIT (OUTPATIENT)
Dept: INTERNAL MEDICINE | Facility: CLINIC | Age: 73
End: 2023-12-11
Payer: MEDICARE

## 2023-12-11 VITALS
HEIGHT: 61 IN | SYSTOLIC BLOOD PRESSURE: 130 MMHG | BODY MASS INDEX: 21.18 KG/M2 | DIASTOLIC BLOOD PRESSURE: 68 MMHG | WEIGHT: 112.2 LBS | RESPIRATION RATE: 16 BRPM | HEART RATE: 88 BPM | OXYGEN SATURATION: 98 %

## 2023-12-11 DIAGNOSIS — K58.9 IRRITABLE BOWEL SYNDROME, UNSPECIFIED TYPE: ICD-10-CM

## 2023-12-11 DIAGNOSIS — E11.9 TYPE 2 DIABETES MELLITUS WITHOUT COMPLICATION, WITHOUT LONG-TERM CURRENT USE OF INSULIN (CMS/HCC): Primary | ICD-10-CM

## 2023-12-11 DIAGNOSIS — M85.80 OSTEOPENIA, UNSPECIFIED LOCATION: ICD-10-CM

## 2023-12-11 DIAGNOSIS — R00.2 HEART PALPITATIONS: Chronic | ICD-10-CM

## 2023-12-11 DIAGNOSIS — E78.5 HYPERLIPIDEMIA, UNSPECIFIED HYPERLIPIDEMIA TYPE: ICD-10-CM

## 2023-12-11 PROCEDURE — 99214 OFFICE O/P EST MOD 30 MIN: CPT | Performed by: INTERNAL MEDICINE

## 2023-12-11 ASSESSMENT — ENCOUNTER SYMPTOMS
ADENOPATHY: 0
CONSTIPATION: 0
BACK PAIN: 0
CHEST TIGHTNESS: 0
CHILLS: 0
RHINORRHEA: 0
UNEXPECTED WEIGHT CHANGE: 0
FREQUENCY: 0
NERVOUS/ANXIOUS: 0
FATIGUE: 0
WOUND: 0
BLOOD IN STOOL: 0
FEVER: 0
MYALGIAS: 0
SORE THROAT: 0
SLEEP DISTURBANCE: 0
COUGH: 0
VOMITING: 0
POLYDIPSIA: 0
DIZZINESS: 0
NECK PAIN: 0
NAUSEA: 0
WHEEZING: 0
PALPITATIONS: 0
WEAKNESS: 0
DIARRHEA: 0
ABDOMINAL PAIN: 0
DYSURIA: 0
SHORTNESS OF BREATH: 0
HEADACHES: 0
EYE PAIN: 0
SINUS PRESSURE: 0
CONFUSION: 0
HEMATURIA: 0

## 2023-12-11 NOTE — PROGRESS NOTES
Subjective      Patient ID: Whitney Ordaz is a 73 y.o. female.    Here for routine f/u exam. Hx reviewd. She has been gen well.  No new concerns. Walks for exercise. No chest symptoms. Weight stable. Compliant with meds.       The following have been reviewed and updated as appropriate in this visit:   Allergies  Meds  Problems       Review of Systems   Constitutional: Negative for chills, fatigue, fever and unexpected weight change.   HENT: Negative for congestion, ear pain, hearing loss, rhinorrhea, sinus pressure and sore throat.    Eyes: Negative for pain and visual disturbance.   Respiratory: Negative for cough, chest tightness, shortness of breath and wheezing.    Cardiovascular: Negative for chest pain, palpitations and leg swelling.   Gastrointestinal: Negative for abdominal pain, blood in stool, constipation, diarrhea, nausea and vomiting.   Endocrine: Negative for cold intolerance, heat intolerance and polydipsia.   Genitourinary: Negative for dysuria, frequency, hematuria and urgency.   Musculoskeletal: Negative for back pain, myalgias and neck pain.   Skin: Negative for rash and wound.   Allergic/Immunologic: Negative for environmental allergies and food allergies.   Neurological: Negative for dizziness, syncope, weakness and headaches.   Hematological: Negative for adenopathy.   Psychiatric/Behavioral: Negative for confusion and sleep disturbance. The patient is not nervous/anxious.          Current Outpatient Medications:   •  CHOLECALCIFEROL, VITAMIN D3, ORAL, Take by mouth daily., Disp: , Rfl:   •  diclofenac sodium (VOLTAREN) 1 % topical gel, Apply topically as needed for mild pain., Disp: , Rfl:   •  ezetimibe (ZETIA) 10 mg tablet, , Disp: , Rfl:   •  inclisiran 284 mg/1.5 mL syringe, Inject under the skin every 6 (six) months., Disp: , Rfl:   •  metFORMIN XR (GLUCOPHAGE-XR) 500 mg 24 hr tablet, Take 1 tab BID, Disp: 180 tablet, Rfl: 1  •  metoprolol succinate XL (TOPROL-XL) 25 mg 24 hr  "tablet, Take 1 tablet (25 mg total) by mouth daily., Disp: 90 tablet, Rfl: 3    Allergies:  Atorvastatin, Rosuvastatin calcium, Sitagliptin, Lidocaine hcl, Glimepiride, Penicillins, Pioglitazone, and Procaine    Past Medical History:   Diagnosis Date   • Allergies    • Arthritis    • Diabetes (CMS/[facility])    • DM2 (diabetes mellitus, type 2) (CMS/HCC) 12/1/2020   • Hand arthritis 12/15/2021   • Hemorrhoids    • History of partial hysterectomy 12/1/2020   • Hx of appendectomy 12/1/2020   • Hx of colonic polyps 12/1/2020   • Hx of tonsillectomy 12/1/2020   • Hyperlipidemia 12/1/2020   • IBS (irritable bowel syndrome) 12/19/2022   • Lipid disorder    • Osteoarthritis of right knee 12/1/2020   • Osteopenia 7/28/2022   • Seasonal allergies 12/1/2020   • Vitamin D deficiency 6/14/2021       Past Surgical History:   Procedure Laterality Date   • APPENDECTOMY     • HYSTERECTOMY     • TONSILLECTOMY         Social History     Tobacco Use   • Smoking status: Never   • Smokeless tobacco: Never   Vaping Use   • Vaping Use: Never used   Substance Use Topics   • Alcohol use: Not Currently   • Drug use: Never       Objective     Vitals:    12/11/23 1128   BP: 130/68   Pulse: 88   Resp: 16   SpO2: 98%   Weight: 50.9 kg (112 lb 3.2 oz)   Height: 1.549 m (5' 1\")       Physical Exam  Constitutional:       Appearance: She is well-developed.   HENT:      Head: Normocephalic and atraumatic.      Right Ear: External ear normal.      Left Ear: External ear normal.      Nose: Nose normal.   Eyes:      Conjunctiva/sclera: Conjunctivae normal.      Pupils: Pupils are equal, round, and reactive to light.   Cardiovascular:      Rate and Rhythm: Normal rate and regular rhythm.      Heart sounds: Normal heart sounds. No murmur heard.  Pulmonary:      Effort: Pulmonary effort is normal.      Breath sounds: Normal breath sounds. No wheezing.   Abdominal:      Palpations: Abdomen is soft.      Tenderness: There is no abdominal tenderness. "   Musculoskeletal:         General: No deformity.      Cervical back: Neck supple.   Skin:     General: Skin is warm and dry.      Findings: No rash.   Neurological:      Mental Status: She is alert and oriented to person, place, and time.   Psychiatric:         Mood and Affect: Mood is not depressed.         Behavior: Behavior normal.         Thought Content: Thought content normal.         Assessment/Plan   Type 2 diabetes mellitus without complication, without long-term current use of insulin (CMS/[facility])  (primary encounter diagnosis)  Comment: controlled; cont meds; opth per routine    Hyperlipidemia, unspecified hyperlipidemia type  Comment: cont leqvio; cards is dr gonzalez    Heart palpitations  Comment: she cont to do well on metoprolol; no chest symptoms    Osteopenia, unspecified location  Comment: cont healthy diet and reg exercise    Irritable bowel syndrome, unspecified type  Comment: satble; no new cocnerns      reviewed labs; rec shingrxi; f/u 6 months for a physical        Porfirio Soto MD    12/11/2023

## 2024-02-13 RX ORDER — METFORMIN HYDROCHLORIDE 500 MG/1
TABLET, EXTENDED RELEASE ORAL
Qty: 180 TABLET | Refills: 1 | Status: SHIPPED | OUTPATIENT
Start: 2024-02-13 | End: 2024-08-16

## 2024-03-13 ENCOUNTER — TELEPHONE (OUTPATIENT)
Dept: INTERNAL MEDICINE | Facility: CLINIC | Age: 74
End: 2024-03-13

## 2024-03-13 NOTE — TELEPHONE ENCOUNTER
[facility] Appointment Request   Provider: diagnostic  Appointment Type: diagnostic  Reason for Visit: covid shot  Available Day and Time: Friday 3/15  Best Contact Number: [phone]    The practice will reach out to schedule your appointment within the next 2 business days.

## 2024-03-13 NOTE — TELEPHONE ENCOUNTER
Pt took some diabetic tussin, for the following symptons that she is having running nose,aches, watery eyes and cough and she wants to know if it's okay or do she needs something else  For her symptoms.   Also she wants to know when can she get her COVID shot  Thank  you

## 2024-03-14 ENCOUNTER — OFFICE VISIT (OUTPATIENT)
Dept: INTERNAL MEDICINE | Facility: CLINIC | Age: 74
End: 2024-03-14
Payer: MEDICARE

## 2024-03-14 VITALS
SYSTOLIC BLOOD PRESSURE: 132 MMHG | RESPIRATION RATE: 17 BRPM | HEART RATE: 97 BPM | OXYGEN SATURATION: 99 % | TEMPERATURE: 98.7 F | DIASTOLIC BLOOD PRESSURE: 60 MMHG | HEIGHT: 61 IN | BODY MASS INDEX: 20.58 KG/M2 | WEIGHT: 109 LBS

## 2024-03-14 DIAGNOSIS — E11.9 TYPE 2 DIABETES MELLITUS WITHOUT COMPLICATION, WITHOUT LONG-TERM CURRENT USE OF INSULIN (CMS/HCC): ICD-10-CM

## 2024-03-14 DIAGNOSIS — J06.9 ACUTE URI: Primary | ICD-10-CM

## 2024-03-14 PROCEDURE — 87637 SARSCOV2&INF A&B&RSV AMP PRB: CPT | Performed by: INTERNAL MEDICINE

## 2024-03-14 PROCEDURE — 99213 OFFICE O/P EST LOW 20 MIN: CPT | Performed by: INTERNAL MEDICINE

## 2024-03-14 ASSESSMENT — PAIN SCALES - GENERAL: PAINLEVEL: 0-NO PAIN

## 2024-03-14 ASSESSMENT — ENCOUNTER SYMPTOMS
BACK PAIN: 0
SHORTNESS OF BREATH: 0
MYALGIAS: 0
CHILLS: 0
DIARRHEA: 0
ADENOPATHY: 0
RHINORRHEA: 1
VOMITING: 0
WOUND: 0
WEAKNESS: 0
NERVOUS/ANXIOUS: 0
FREQUENCY: 0
HEADACHES: 0
CONFUSION: 0
SORE THROAT: 1
NECK PAIN: 0
ABDOMINAL PAIN: 0
DIZZINESS: 0
BLOOD IN STOOL: 0
POLYDIPSIA: 0
COUGH: 1
SLEEP DISTURBANCE: 0
EYE PAIN: 0
SINUS PRESSURE: 0
WHEEZING: 0
DYSURIA: 0
NAUSEA: 0
CONSTIPATION: 0
FEVER: 0
CHEST TIGHTNESS: 0
UNEXPECTED WEIGHT CHANGE: 0
PALPITATIONS: 0
FATIGUE: 0
HEMATURIA: 0

## 2024-03-14 NOTE — PROGRESS NOTES
Subjective      Patient ID: Whitney Ordaz is a 73 y.o. female.    Pt c/o head congestion and cough. Started a few days ago with sore throat. No fevers, cp or sob. Neg home covid test.       The following have been reviewed and updated as appropriate in this visit:   Allergies  Meds  Problems       Review of Systems   Constitutional: Negative for chills, fatigue, fever and unexpected weight change.   HENT: Positive for congestion, rhinorrhea and sore throat. Negative for ear pain, hearing loss and sinus pressure.    Eyes: Negative for pain and visual disturbance.   Respiratory: Positive for cough. Negative for chest tightness, shortness of breath and wheezing.    Cardiovascular: Negative for chest pain, palpitations and leg swelling.   Gastrointestinal: Negative for abdominal pain, blood in stool, constipation, diarrhea, nausea and vomiting.   Endocrine: Negative for cold intolerance, heat intolerance and polydipsia.   Genitourinary: Negative for dysuria, frequency, hematuria and urgency.   Musculoskeletal: Negative for back pain, myalgias and neck pain.   Skin: Negative for rash and wound.   Allergic/Immunologic: Negative for environmental allergies and food allergies.   Neurological: Negative for dizziness, syncope, weakness and headaches.   Hematological: Negative for adenopathy.   Psychiatric/Behavioral: Negative for confusion and sleep disturbance. The patient is not nervous/anxious.          Current Outpatient Medications:   •  CHOLECALCIFEROL, VITAMIN D3, ORAL, Take by mouth daily., Disp: , Rfl:   •  diclofenac sodium (VOLTAREN) 1 % topical gel, Apply topically as needed for mild pain., Disp: , Rfl:   •  ezetimibe (ZETIA) 10 mg tablet, , Disp: , Rfl:   •  inclisiran 284 mg/1.5 mL syringe, Inject under the skin every 6 (six) months., Disp: , Rfl:   •  metFORMIN XR (GLUCOPHAGE-XR) 500 mg 24 hr tablet, TAKE 1 TABLET BY MOUTH TWICE A DAY, Disp: 180 tablet, Rfl: 1  •  metoprolol succinate XL (TOPROL-XL)  "25 mg 24 hr tablet, Take 1 tablet (25 mg total) by mouth daily., Disp: 90 tablet, Rfl: 3    Allergies:  Atorvastatin, Rosuvastatin calcium, Sitagliptin, Lidocaine hcl, Glimepiride, Penicillins, Pioglitazone, and Procaine    Past Medical History:   Diagnosis Date   • Allergies    • Arthritis    • Diabetes (CMS/HCC)    • DM2 (diabetes mellitus, type 2) (CMS/HCC) 12/1/2020   • Hand arthritis 12/15/2021   • Hemorrhoids    • History of partial hysterectomy 12/1/2020   • Hx of appendectomy 12/1/2020   • Hx of colonic polyps 12/1/2020   • Hx of tonsillectomy 12/1/2020   • Hyperlipidemia 12/1/2020   • IBS (irritable bowel syndrome) 12/19/2022   • Lipid disorder    • Osteoarthritis of right knee 12/1/2020   • Osteopenia 7/28/2022   • Seasonal allergies 12/1/2020   • Vitamin D deficiency 6/14/2021       Past Surgical History:   Procedure Laterality Date   • APPENDECTOMY     • HYSTERECTOMY     • TONSILLECTOMY         Social History     Tobacco Use   • Smoking status: Never   • Smokeless tobacco: Never   Vaping Use   • Vaping Use: Never used   Substance Use Topics   • Alcohol use: Not Currently   • Drug use: Never       Objective     Vitals:    03/14/24 1636   BP: 132/60   BP Location: Right upper arm   Patient Position: Sitting   Pulse: 97   Resp: 17   Temp: 37.1 °C (98.7 °F)   TempSrc: Oral   SpO2: 99%   Weight: 49.4 kg (109 lb)   Height: 1.549 m (5' 1\")       Physical Exam  Constitutional:       Appearance: She is well-developed.   HENT:      Head: Normocephalic and atraumatic.      Right Ear: External ear normal.      Left Ear: External ear normal.      Nose: Nose normal.   Eyes:      Conjunctiva/sclera: Conjunctivae normal.      Pupils: Pupils are equal, round, and reactive to light.   Cardiovascular:      Rate and Rhythm: Normal rate and regular rhythm.      Heart sounds: Normal heart sounds. No murmur heard.  Pulmonary:      Effort: Pulmonary effort is normal.      Breath sounds: Normal breath sounds. No wheezing. "   Abdominal:      Palpations: Abdomen is soft.   Musculoskeletal:         General: No deformity.      Cervical back: Neck supple.   Skin:     General: Skin is warm and dry.      Findings: No rash.   Neurological:      Mental Status: She is alert and oriented to person, place, and time.   Psychiatric:         Mood and Affect: Mood is not depressed.         Behavior: Behavior normal.         Thought Content: Thought content normal.         Assessment/Plan   Acute URI  (primary encounter diagnosis)  Comment: pt appears well; lungs clear; suspect viral uri; test for flu, covid, rsv; we dsic conservative care and f/u precautions  Plan: SARS-COV-2 (COVID-19)/ FLU A/B, AND RSV, PCR    Type 2 diabetes mellitus without complication, without long-term current use of insulin (CMS/[facility])  Comment: controlled; cont meds; opth per routine          Porfirio Soto MD    3/14/2024

## 2024-03-26 ENCOUNTER — CLINICAL SUPPORT (OUTPATIENT)
Dept: INTERNAL MEDICINE | Facility: CLINIC | Age: 74
End: 2024-03-26
Payer: MEDICARE

## 2024-03-26 DIAGNOSIS — Z23 NEED FOR COVID-19 VACCINE: Primary | ICD-10-CM

## 2024-03-26 PROCEDURE — 91322 SARSCOV2 VAC 50 MCG/0.5ML IM: CPT | Performed by: INTERNAL MEDICINE

## 2024-03-26 PROCEDURE — 90480 ADMN SARSCOV2 VAC 1/ONLY CMP: CPT | Performed by: INTERNAL MEDICINE

## 2024-04-08 ENCOUNTER — TELEPHONE (OUTPATIENT)
Dept: INTERNAL MEDICINE | Facility: CLINIC | Age: 74
End: 2024-04-08

## 2024-04-08 ENCOUNTER — HOSPITAL ENCOUNTER (OUTPATIENT)
Dept: RADIOLOGY | Age: 74
Discharge: HOME | End: 2024-04-08
Attending: INTERNAL MEDICINE
Payer: MEDICARE

## 2024-04-08 ENCOUNTER — TELEMEDICINE (OUTPATIENT)
Dept: INTERNAL MEDICINE | Facility: CLINIC | Age: 74
End: 2024-04-08
Payer: MEDICARE

## 2024-04-08 DIAGNOSIS — R05.1 ACUTE COUGH: ICD-10-CM

## 2024-04-08 DIAGNOSIS — R05.1 ACUTE COUGH: Primary | ICD-10-CM

## 2024-04-08 DIAGNOSIS — J01.90 ACUTE NON-RECURRENT SINUSITIS, UNSPECIFIED LOCATION: Primary | ICD-10-CM

## 2024-04-08 PROCEDURE — 71046 X-RAY EXAM CHEST 2 VIEWS: CPT

## 2024-04-08 PROCEDURE — 99442 PR PHYS/QHP TELEPHONE EVALUATION 11-20 MIN: CPT | Mod: 95 | Performed by: INTERNAL MEDICINE

## 2024-04-08 RX ORDER — GUAIFENESIN AND PSEUDOEPHEDRINE HCL 1200; 120 MG/1; MG/1
1 TABLET, EXTENDED RELEASE ORAL EVERY 12 HOURS
Qty: 20 EACH | Refills: 0 | Status: SHIPPED | OUTPATIENT
Start: 2024-04-08 | End: 2024-06-12

## 2024-04-08 RX ORDER — DOXYCYCLINE HYCLATE 100 MG
100 TABLET ORAL 2 TIMES DAILY
Qty: 14 TABLET | Refills: 0 | Status: SHIPPED | OUTPATIENT
Start: 2024-04-08 | End: 2024-04-15

## 2024-04-08 ASSESSMENT — ENCOUNTER SYMPTOMS
POLYDIPSIA: 0
CONFUSION: 0
CHILLS: 0
CHEST TIGHTNESS: 0
HEMATURIA: 0
UNEXPECTED WEIGHT CHANGE: 0
SORE THROAT: 0
DIZZINESS: 0
FATIGUE: 0
WEAKNESS: 0
SHORTNESS OF BREATH: 0
CONSTIPATION: 0
DYSURIA: 0
VOMITING: 0
MYALGIAS: 0
RHINORRHEA: 1
HEADACHES: 0
SINUS PRESSURE: 1
EYE PAIN: 0
DIARRHEA: 0
ABDOMINAL PAIN: 0
NAUSEA: 0
BLOOD IN STOOL: 0
WHEEZING: 0
BACK PAIN: 0
FEVER: 0
NERVOUS/ANXIOUS: 0
ADENOPATHY: 0
FREQUENCY: 0
WOUND: 0
PALPITATIONS: 0
COUGH: 0
SLEEP DISTURBANCE: 0
NECK PAIN: 0

## 2024-04-08 NOTE — PROGRESS NOTES
Verification of Patient Location:  The patient affirms they are currently located in the following state: [place]    Request for Consent:    Audio Only Encounter   You and I are about to have a telemedicine check-in or visit. This is allowed because you have requested it. This telemedicine visit will be billed to your health insurance or you, if you are self-insured. You understand you will be responsible for any copayments or coinsurances that apply to your telemedicine visit. Before starting our telemedicine visit, I am required to get your consent for this virtual check-in or visit by telemedicine. Do you consent?    Patient Response to Request for Consent:  Yes      Visit Documentation:  Subjective     Patient ID: Whitney Ordaz is a 73 y.o. female.  1950      Pt c/o head congestion and pressure with post nasal drip. Started 3 weeks ago as a cold. Very bothersome. No fevers. No cp or sob. Cxr earlier today was clear. Using mucinex.         The following have been reviewed and updated as appropriate in this visit:        Review of Systems   Constitutional:  Negative for chills, fatigue, fever and unexpected weight change.   HENT:  Positive for congestion, postnasal drip, rhinorrhea and sinus pressure. Negative for ear pain, hearing loss and sore throat.    Eyes:  Negative for pain and visual disturbance.   Respiratory:  Negative for cough, chest tightness, shortness of breath and wheezing.    Cardiovascular:  Negative for chest pain, palpitations and leg swelling.   Gastrointestinal:  Negative for abdominal pain, blood in stool, constipation, diarrhea, nausea and vomiting.   Endocrine: Negative for cold intolerance, heat intolerance and polydipsia.   Genitourinary:  Negative for dysuria, frequency, hematuria and urgency.   Musculoskeletal:  Negative for back pain, myalgias and neck pain.   Skin:  Negative for rash and wound.   Allergic/Immunologic: Negative for environmental allergies and food  allergies.   Neurological:  Negative for dizziness, syncope, weakness and headaches.   Hematological:  Negative for adenopathy.   Psychiatric/Behavioral:  Negative for confusion and sleep disturbance. The patient is not nervous/anxious.      Past Medical History:   Diagnosis Date    Allergies     Arthritis     Diabetes (CMS/[facility])     DM2 (diabetes mellitus, type 2) (CMS/[facility]) 12/1/2020    Hand arthritis 12/15/2021    Hemorrhoids     History of partial hysterectomy 12/1/2020    Hx of appendectomy 12/1/2020    Hx of colonic polyps 12/1/2020    Hx of tonsillectomy 12/1/2020    Hyperlipidemia 12/1/2020    IBS (irritable bowel syndrome) 12/19/2022    Lipid disorder     Osteoarthritis of right knee 12/1/2020    Osteopenia 7/28/2022    Seasonal allergies 12/1/2020    Vitamin D deficiency 6/14/2021     Current Outpatient Medications on File Prior to Visit   Medication Sig Dispense Refill    CHOLECALCIFEROL, VITAMIN D3, ORAL Take by mouth daily.      diclofenac sodium (VOLTAREN) 1 % topical gel Apply topically as needed for mild pain.      ezetimibe (ZETIA) 10 mg tablet       inclisiran 284 mg/1.5 mL syringe Inject under the skin every 6 (six) months.      metFORMIN XR (GLUCOPHAGE-XR) 500 mg 24 hr tablet TAKE 1 TABLET BY MOUTH TWICE A  tablet 1    metoprolol succinate XL (TOPROL-XL) 25 mg 24 hr tablet Take 1 tablet (25 mg total) by mouth daily. 90 tablet 3     No current facility-administered medications on file prior to visit.     Allergies as of 04/08/2024 - Reviewed 03/14/2024   Allergen Reaction Noted    Atorvastatin  01/28/2020    Rosuvastatin calcium  07/28/2012    Sitagliptin  01/28/2020    Lidocaine hcl  01/08/2018    Glimepiride  09/14/2020    Penicillins  07/22/2009    Pioglitazone  01/28/2020    Procaine  07/22/2009           Assessment/Plan   Diagnoses and all orders for this visit:    Acute non-recurrent sinusitis, unspecified location (Primary)  Comments:  rx mucinex-d and course of doxycycline;  f/u  precautions discussed    Other orders  -     pseudoephedrine-guaifenesin (MUCINEX D MAXIMUM STRENGTH) 120-1,200 mg tablet extended release 12 hr; Take 1 tablet by mouth every 12 (twelve) hours.  -     doxycycline hyclate (VIBRA-TABS) 100 mg tablet; Take 1 tablet (100 mg total) by mouth 2 (two) times a day for 7 days.        Time Spent:  I spent 15 minutes on this date of service performing the following activities: obtaining history, entering orders, documenting, preparing for visit, obtaining / reviewing records, and providing counseling and education.

## 2024-04-08 NOTE — TELEPHONE ENCOUNTER
----- Message from Porfirio Soto MD sent at 4/8/2024 11:44 AM EDT -----  Regarding: FW: office visit plus antibiotic  Contact: [phone]        ----- Message -----  From: Sabrina Buckley RN  Sent: 4/8/2024  11:36 AM EDT  To: Porfirio Soto MD  Subject: FW: office visit plus antibiotic                 Dr. Soto, 4:40pm apt is no longer available. Pt would like to discuss symptoms with you today, would you like a telemed apt at 5:00pm?     ----- Message -----  From: Whitney Shepherd  Sent: 4/8/2024  10:28 AM EDT  To:  [facility] Clinical Support [facility]  Subject: office visit plus antibiotic                     Yes, thank you so very much.

## 2024-04-09 ENCOUNTER — TRANSCRIBE ORDERS (OUTPATIENT)
Dept: SCHEDULING | Age: 74
End: 2024-04-09

## 2024-04-09 ENCOUNTER — APPOINTMENT (OUTPATIENT)
Dept: LAB | Facility: HOSPITAL | Age: 74
End: 2024-04-09
Attending: INTERNAL MEDICINE
Payer: MEDICARE

## 2024-04-09 DIAGNOSIS — R79.89 OTHER SPECIFIED ABNORMAL FINDINGS OF BLOOD CHEMISTRY: ICD-10-CM

## 2024-04-09 DIAGNOSIS — E78.5 HYPERLIPIDEMIA, UNSPECIFIED: Primary | ICD-10-CM

## 2024-04-09 DIAGNOSIS — E78.5 HYPERLIPIDEMIA, UNSPECIFIED: ICD-10-CM

## 2024-04-09 LAB
ALBUMIN SERPL-MCNC: 4.4 G/DL (ref 3.5–5.7)
ALP SERPL-CCNC: 73 IU/L (ref 34–125)
ALT SERPL-CCNC: 13 IU/L (ref 7–52)
ANION GAP SERPL CALC-SCNC: 11 MEQ/L (ref 3–15)
AST SERPL-CCNC: 14 IU/L (ref 13–39)
BILIRUB SERPL-MCNC: 0.5 MG/DL (ref 0.3–1.2)
BUN SERPL-MCNC: 14 MG/DL (ref 7–25)
CALCIUM SERPL-MCNC: 10.1 MG/DL (ref 8.6–10.3)
CHLORIDE SERPL-SCNC: 98 MEQ/L (ref 98–107)
CHOLEST SERPL-MCNC: 155 MG/DL
CO2 SERPL-SCNC: 29 MEQ/L (ref 21–31)
CREAT SERPL-MCNC: 0.8 MG/DL (ref 0.6–1.2)
CREAT UR-MCNC: 58.3 MG/DL
EGFRCR SERPLBLD CKD-EPI 2021: >60 ML/MIN/1.73M*2
EST. AVERAGE GLUCOSE BLD GHB EST-MCNC: 157 MG/DL
GLUCOSE SERPL-MCNC: 140 MG/DL (ref 70–99)
HBA1C MFR BLD: 7.1 %
HDLC SERPL-MCNC: 56 MG/DL
HDLC SERPL: 2.8 {RATIO}
LDLC SERPL CALC-MCNC: 64 MG/DL
MICROALBUMIN UR-MCNC: 3.7 MG/L
MICROALBUMIN/CREAT UR: 6.3 UG/MG
NONHDLC SERPL-MCNC: 99 MG/DL
POTASSIUM SERPL-SCNC: 4.4 MEQ/L (ref 3.5–5.1)
PROT SERPL-MCNC: 7.1 G/DL (ref 6–8.2)
SODIUM SERPL-SCNC: 138 MEQ/L (ref 136–145)
TRIGL SERPL-MCNC: 176 MG/DL
TSH SERPL DL<=0.05 MIU/L-ACNC: 2.2 MIU/L (ref 0.34–5.6)

## 2024-04-09 PROCEDURE — 84443 ASSAY THYROID STIM HORMONE: CPT

## 2024-04-09 PROCEDURE — 80061 LIPID PANEL: CPT

## 2024-04-09 PROCEDURE — 36415 COLL VENOUS BLD VENIPUNCTURE: CPT

## 2024-04-09 PROCEDURE — 80053 COMPREHEN METABOLIC PANEL: CPT

## 2024-04-09 PROCEDURE — 83036 HEMOGLOBIN GLYCOSYLATED A1C: CPT

## 2024-04-09 PROCEDURE — 82043 UR ALBUMIN QUANTITATIVE: CPT

## 2024-05-02 ENCOUNTER — OFFICE VISIT (OUTPATIENT)
Dept: INTERNAL MEDICINE | Facility: CLINIC | Age: 74
End: 2024-05-02
Payer: MEDICARE

## 2024-05-02 VITALS
HEIGHT: 61 IN | SYSTOLIC BLOOD PRESSURE: 120 MMHG | TEMPERATURE: 98.1 F | OXYGEN SATURATION: 99 % | BODY MASS INDEX: 20.58 KG/M2 | RESPIRATION RATE: 16 BRPM | WEIGHT: 109 LBS | DIASTOLIC BLOOD PRESSURE: 60 MMHG | HEART RATE: 84 BPM

## 2024-05-02 DIAGNOSIS — R22.32 MASS OF LEFT HAND: Primary | ICD-10-CM

## 2024-05-02 DIAGNOSIS — J30.2 SEASONAL ALLERGIES: ICD-10-CM

## 2024-05-02 PROCEDURE — 99213 OFFICE O/P EST LOW 20 MIN: CPT | Performed by: INTERNAL MEDICINE

## 2024-05-02 ASSESSMENT — ENCOUNTER SYMPTOMS
PALPITATIONS: 0
SLEEP DISTURBANCE: 0
HEADACHES: 0
FEVER: 0
SINUS PRESSURE: 0
CHILLS: 0
SORE THROAT: 0
SHORTNESS OF BREATH: 0
MYALGIAS: 0
DIZZINESS: 0
BLOOD IN STOOL: 0
NAUSEA: 0
CONSTIPATION: 0
ADENOPATHY: 0
FATIGUE: 0
WOUND: 0
WHEEZING: 0
NECK PAIN: 0
NERVOUS/ANXIOUS: 0
HEMATURIA: 0
UNEXPECTED WEIGHT CHANGE: 0
POLYDIPSIA: 0
VOMITING: 0
DIARRHEA: 0
FREQUENCY: 0
EYE PAIN: 0
CHEST TIGHTNESS: 0
WEAKNESS: 0
COUGH: 0
CONFUSION: 0
DYSURIA: 0
BACK PAIN: 0
ABDOMINAL PAIN: 0
RHINORRHEA: 0

## 2024-05-02 ASSESSMENT — PAIN SCALES - GENERAL: PAINLEVEL: 0-NO PAIN

## 2024-05-02 NOTE — PROGRESS NOTES
Subjective      Patient ID: Whitney Ordaz is a 74 y.o. female.    Pt has 2 concerns:  1. Lump in palm of left hand x 2-3 weeks. No injury or trigger. Not really bothersome.  2. Post nasal drip. Occ earaches. Last few weeks. She has allergies. Using flonase on/off.         The following have been reviewed and updated as appropriate in this visit:   Allergies  Meds  Problems       Review of Systems   Constitutional:  Negative for chills, fatigue, fever and unexpected weight change.   HENT:  Positive for postnasal drip. Negative for congestion, ear pain, hearing loss, rhinorrhea, sinus pressure and sore throat.    Eyes:  Negative for pain and visual disturbance.   Respiratory:  Negative for cough, chest tightness, shortness of breath and wheezing.    Cardiovascular:  Negative for chest pain, palpitations and leg swelling.   Gastrointestinal:  Negative for abdominal pain, blood in stool, constipation, diarrhea, nausea and vomiting.   Endocrine: Negative for cold intolerance, heat intolerance and polydipsia.   Genitourinary:  Negative for dysuria, frequency, hematuria and urgency.   Musculoskeletal:  Negative for back pain, myalgias and neck pain.   Skin:  Negative for rash and wound.   Allergic/Immunologic: Negative for environmental allergies and food allergies.   Neurological:  Negative for dizziness, syncope, weakness and headaches.   Hematological:  Negative for adenopathy.   Psychiatric/Behavioral:  Negative for confusion and sleep disturbance. The patient is not nervous/anxious.          Current Outpatient Medications:     CHOLECALCIFEROL, VITAMIN D3, ORAL, Take by mouth daily., Disp: , Rfl:     diclofenac sodium (VOLTAREN) 1 % topical gel, Apply topically as needed for mild pain., Disp: , Rfl:     ezetimibe (ZETIA) 10 mg tablet, , Disp: , Rfl:     inclisiran 284 mg/1.5 mL syringe, Inject under the skin every 6 (six) months., Disp: , Rfl:     metFORMIN XR (GLUCOPHAGE-XR) 500 mg 24 hr tablet, TAKE 1  "TABLET BY MOUTH TWICE A DAY, Disp: 180 tablet, Rfl: 1    metoprolol succinate XL (TOPROL-XL) 25 mg 24 hr tablet, Take 1 tablet (25 mg total) by mouth daily., Disp: 90 tablet, Rfl: 3    pseudoephedrine-guaifenesin (MUCINEX D MAXIMUM STRENGTH) 120-1,200 mg tablet extended release 12 hr, Take 1 tablet by mouth every 12 (twelve) hours., Disp: 20 each, Rfl: 0    Allergies:  Atorvastatin, Rosuvastatin calcium, Sitagliptin, Lidocaine hcl, Glimepiride, Penicillins, Pioglitazone, and Procaine    Past Medical History:   Diagnosis Date    Allergies     Arthritis     Diabetes (CMS/[facility])     DM2 (diabetes mellitus, type 2) (CMS/[facility]) 12/1/2020    Hand arthritis 12/15/2021    Hemorrhoids     History of partial hysterectomy 12/1/2020    Hx of appendectomy 12/1/2020    Hx of colonic polyps 12/1/2020    Hx of tonsillectomy 12/1/2020    Hyperlipidemia 12/1/2020    IBS (irritable bowel syndrome) 12/19/2022    Lipid disorder     Osteoarthritis of right knee 12/1/2020    Osteopenia 7/28/2022    Seasonal allergies 12/1/2020    Vitamin D deficiency 6/14/2021       Past Surgical History:   Procedure Laterality Date    APPENDECTOMY      HYSTERECTOMY      TONSILLECTOMY         Social History     Tobacco Use    Smoking status: Never    Smokeless tobacco: Never   Vaping Use    Vaping Use: Never used   Substance Use Topics    Alcohol use: Not Currently    Drug use: Never       Objective     Vitals:    05/02/24 1126   BP: 120/60   BP Location: Right upper arm   Patient Position: Sitting   Pulse: 84   Resp: 16   Temp: 36.7 °C (98.1 °F)   TempSrc: Temporal   SpO2: 99%   Weight: 49.4 kg (109 lb)   Height: 1.549 m (5' 1\")       Physical Exam  Constitutional:       Appearance: She is well-developed.   HENT:      Head: Normocephalic and atraumatic.      Right Ear: Tympanic membrane, ear canal and external ear normal.      Left Ear: Tympanic membrane, ear canal and external ear normal.      Nose: Nose normal.   Eyes:      Conjunctiva/sclera: Conjunctivae " normal.      Pupils: Pupils are equal, round, and reactive to light.   Cardiovascular:      Rate and Rhythm: Normal rate.   Pulmonary:      Effort: Pulmonary effort is normal.   Abdominal:      Palpations: Abdomen is soft.   Musculoskeletal:         General: No deformity.      Cervical back: Neck supple.      Comments: Left palm: firm lump, min tenderness, no skin changes   Skin:     General: Skin is warm and dry.      Findings: No rash.   Neurological:      Mental Status: She is alert and oriented to person, place, and time.   Psychiatric:         Mood and Affect: Mood is not depressed.         Behavior: Behavior normal.         Thought Content: Thought content normal.         Assessment/Plan   Mass of left hand  (primary encounter diagnosis)  Comment: cyst vs hematoma vs early duputrens; ref to hand ortho  Plan: Ambulatory referral to Orthopedic Surgery    Seasonal allergies  Comment: start daily fifi Soto MD    5/2/2024

## 2024-06-06 ENCOUNTER — TELEPHONE (OUTPATIENT)
Dept: INTERNAL MEDICINE | Facility: CLINIC | Age: 74
End: 2024-06-06
Payer: MEDICARE

## 2024-06-06 NOTE — TELEPHONE ENCOUNTER
Patient called in following up about her My Chart message. She states she is fasting today and would like a call back to see if this is something she could get taken care of today. Please advise.

## 2024-06-06 NOTE — TELEPHONE ENCOUNTER
Spoke to pt, Per , pt do not need to get blood work done, because she had if done a few months ago.

## 2024-06-12 ENCOUNTER — OFFICE VISIT (OUTPATIENT)
Dept: INTERNAL MEDICINE | Facility: CLINIC | Age: 74
End: 2024-06-12
Payer: MEDICARE

## 2024-06-12 VITALS
RESPIRATION RATE: 16 BRPM | OXYGEN SATURATION: 99 % | HEIGHT: 61 IN | WEIGHT: 109 LBS | DIASTOLIC BLOOD PRESSURE: 68 MMHG | BODY MASS INDEX: 20.58 KG/M2 | HEART RATE: 70 BPM | SYSTOLIC BLOOD PRESSURE: 132 MMHG

## 2024-06-12 DIAGNOSIS — M72.0 DUPUYTREN'S CONTRACTURE OF LEFT HAND: ICD-10-CM

## 2024-06-12 DIAGNOSIS — M85.80 OSTEOPENIA, UNSPECIFIED LOCATION: ICD-10-CM

## 2024-06-12 DIAGNOSIS — M19.049 HAND ARTHRITIS: ICD-10-CM

## 2024-06-12 DIAGNOSIS — K58.9 IRRITABLE BOWEL SYNDROME, UNSPECIFIED TYPE: ICD-10-CM

## 2024-06-12 DIAGNOSIS — E55.9 VITAMIN D DEFICIENCY: ICD-10-CM

## 2024-06-12 DIAGNOSIS — E78.5 HYPERLIPIDEMIA, UNSPECIFIED HYPERLIPIDEMIA TYPE: ICD-10-CM

## 2024-06-12 DIAGNOSIS — Z86.0100 HX OF COLONIC POLYPS: ICD-10-CM

## 2024-06-12 DIAGNOSIS — E11.9 TYPE 2 DIABETES MELLITUS WITHOUT COMPLICATION, WITHOUT LONG-TERM CURRENT USE OF INSULIN (CMS/HCC): Primary | ICD-10-CM

## 2024-06-12 PROCEDURE — 99214 OFFICE O/P EST MOD 30 MIN: CPT | Performed by: INTERNAL MEDICINE

## 2024-06-12 PROCEDURE — G2211 COMPLEX E/M VISIT ADD ON: HCPCS | Performed by: INTERNAL MEDICINE

## 2024-06-12 ASSESSMENT — ENCOUNTER SYMPTOMS
DYSURIA: 0
SINUS PRESSURE: 0
RHINORRHEA: 0
DIZZINESS: 0
UNEXPECTED WEIGHT CHANGE: 0
MYALGIAS: 0
HEADACHES: 0
BLOOD IN STOOL: 0
WOUND: 0
SHORTNESS OF BREATH: 0
CHEST TIGHTNESS: 0
WHEEZING: 0
CONSTIPATION: 0
NAUSEA: 0
CONFUSION: 0
PALPITATIONS: 0
COUGH: 0
WEAKNESS: 0
ARTHRALGIAS: 1
HEMATURIA: 0
NECK PAIN: 0
ABDOMINAL PAIN: 0
FEVER: 0
FATIGUE: 0
ADENOPATHY: 0
POLYDIPSIA: 0
SLEEP DISTURBANCE: 0
BACK PAIN: 0
EYE PAIN: 0
NERVOUS/ANXIOUS: 0
VOMITING: 0
DIARRHEA: 0
SORE THROAT: 0
FREQUENCY: 0
CHILLS: 0

## 2024-06-12 NOTE — PROGRESS NOTES
Subjective      Patient ID: Whitney Ordaz is a 74 y.o. female.    Here for annual exam. Hx reviewd. She has been gen well.  Aches and pain in her back and hands. She saw ortho and the hand lump and they suspected duputrens as well. It is not very bothersome. Otherwise well. Walks for exercise. No chest symptoms. Weight stable. Compliant with meds.         The following have been reviewed and updated as appropriate in this visit:   Allergies  Meds  Problems       Review of Systems   Constitutional:  Negative for chills, fatigue, fever and unexpected weight change.   HENT:  Negative for congestion, ear pain, hearing loss, rhinorrhea, sinus pressure and sore throat.    Eyes:  Negative for pain and visual disturbance.   Respiratory:  Negative for cough, chest tightness, shortness of breath and wheezing.    Cardiovascular:  Negative for chest pain, palpitations and leg swelling.   Gastrointestinal:  Negative for abdominal pain, blood in stool, constipation, diarrhea, nausea and vomiting.   Endocrine: Negative for cold intolerance, heat intolerance and polydipsia.   Genitourinary:  Negative for dysuria, frequency, hematuria and urgency.   Musculoskeletal:  Positive for arthralgias. Negative for back pain, myalgias and neck pain.   Skin:  Negative for rash and wound.   Allergic/Immunologic: Negative for environmental allergies and food allergies.   Neurological:  Negative for dizziness, syncope, weakness and headaches.   Hematological:  Negative for adenopathy.   Psychiatric/Behavioral:  Negative for confusion and sleep disturbance. The patient is not nervous/anxious.          Current Outpatient Medications:     CHOLECALCIFEROL, VITAMIN D3, ORAL, Take by mouth daily., Disp: , Rfl:     diclofenac sodium (VOLTAREN) 1 % topical gel, Apply topically as needed for mild pain., Disp: , Rfl:     ezetimibe (ZETIA) 10 mg tablet, , Disp: , Rfl:     inclisiran 284 mg/1.5 mL syringe, Inject under the skin every 6 (six)  "months., Disp: , Rfl:     metFORMIN XR (GLUCOPHAGE-XR) 500 mg 24 hr tablet, TAKE 1 TABLET BY MOUTH TWICE A DAY, Disp: 180 tablet, Rfl: 1    metoprolol succinate XL (TOPROL-XL) 25 mg 24 hr tablet, Take 1 tablet (25 mg total) by mouth daily., Disp: 90 tablet, Rfl: 3    Allergies:  Atorvastatin, Rosuvastatin calcium, Sitagliptin, Lidocaine hcl, Glimepiride, Penicillins, Pioglitazone, and Procaine    Past Medical History:   Diagnosis Date    Allergies     Arthritis     Diabetes (CMS/[facility])     DM2 (diabetes mellitus, type 2) (CMS/[facility]) 12/01/2020    Dupuytren's contracture of left hand 06/12/2024    Hand arthritis 12/15/2021    Hemorrhoids     History of partial hysterectomy 12/01/2020    Hx of appendectomy 12/01/2020    Hx of colonic polyps 12/01/2020    Hx of tonsillectomy 12/01/2020    Hyperlipidemia 12/01/2020    IBS (irritable bowel syndrome) 12/19/2022    Lipid disorder     Osteoarthritis of right knee 12/01/2020    Osteopenia 07/28/2022    Seasonal allergies 12/01/2020    Vitamin D deficiency 06/14/2021       Past Surgical History:   Procedure Laterality Date    APPENDECTOMY      HYSTERECTOMY      TONSILLECTOMY         Social History     Tobacco Use    Smoking status: Never    Smokeless tobacco: Never   Vaping Use    Vaping Use: Never used   Substance Use Topics    Alcohol use: Not Currently    Drug use: Never       Objective     Vitals:    06/12/24 1033   BP: 132/68   BP Location: Right upper arm   Patient Position: Sitting   Pulse: 70   Resp: 16   SpO2: 99%   Weight: 49.4 kg (109 lb)   Height: 1.549 m (5' 1\")       Physical Exam  Constitutional:       Appearance: She is well-developed.   HENT:      Head: Normocephalic and atraumatic.      Right Ear: External ear normal.      Left Ear: External ear normal.      Nose: Nose normal.   Eyes:      Conjunctiva/sclera: Conjunctivae normal.      Pupils: Pupils are equal, round, and reactive to light.   Neck:      Vascular: No carotid bruit.   Cardiovascular:      Rate and " Rhythm: Normal rate and regular rhythm.      Heart sounds: Normal heart sounds. No murmur heard.  Pulmonary:      Effort: Pulmonary effort is normal.      Breath sounds: Normal breath sounds. No wheezing.   Abdominal:      General: Bowel sounds are normal.      Palpations: Abdomen is soft.      Tenderness: There is no abdominal tenderness.   Musculoskeletal:         General: No deformity.      Cervical back: Neck supple.   Skin:     General: Skin is warm and dry.      Findings: No rash.   Neurological:      Mental Status: She is alert and oriented to person, place, and time.   Psychiatric:         Mood and Affect: Mood is not depressed.         Behavior: Behavior normal.         Thought Content: Thought content normal.         Assessment/Plan   Type 2 diabetes mellitus without complication, without long-term current use of insulin (CMS/[facility])  (primary encounter diagnosis)  Comment: recent a1c is acceptable; cont meds; optho per routine  Plan: Hemoglobin A1c, Microalbumin/Creatinine Ur         Random    Hyperlipidemia, unspecified hyperlipidemia type  Comment: cont meds; cards is dr gonzalez; she had very low brendan score in 2020; no chest symptoms  Plan: CBC and Differential, Lipid panel,         Comprehensive metabolic panel, TSH 3rd         Generation    Hand arthritis  Comment: she uses volatren and we disc otc supplemnst for oa    Irritable bowel syndrome, unspecified type  Comment: this is stable and not too bothersome it seems    Hx of colonic polyps  Comment: colo 2022 with dr live; f/u 3 yrs    Osteopenia, unspecified location  Comment: cont healthy diet and reg exercsie and we'll rechekc dexa next yr    Vitamin D deficiency  Comment: cont supplement  Plan: Vitamin D 25 hydroxy    Dupuytren's contracture of left hand  Comment: noted; annoying  but not very bothrsome    HM  Pt appears well; labs reviewed; disc rec vaccines; mammo and dexa done (ely); f/u 6 months with labs    I attest that this visit supports  the complexity inherent to evaluation and management associated with medical care services that serve as the continuing focal point for all needed health care services and/or medical care services that are part of ongoing care related to this patient's single, serious condition or a complex condition.          Porfirio Soto MD    6/12/2024

## 2024-08-16 RX ORDER — METFORMIN HYDROCHLORIDE 500 MG/1
TABLET, EXTENDED RELEASE ORAL
Qty: 180 TABLET | Refills: 1 | Status: SHIPPED | OUTPATIENT
Start: 2024-08-16 | End: 2025-01-07 | Stop reason: SDUPTHER

## 2024-09-30 ENCOUNTER — CLINICAL SUPPORT (OUTPATIENT)
Dept: INTERNAL MEDICINE | Facility: CLINIC | Age: 74
End: 2024-09-30
Payer: MEDICARE

## 2024-09-30 DIAGNOSIS — Z23 FLU VACCINE NEED: Primary | ICD-10-CM

## 2024-09-30 PROCEDURE — 90662 IIV NO PRSV INCREASED AG IM: CPT | Performed by: INTERNAL MEDICINE

## 2024-09-30 PROCEDURE — G0008 ADMIN INFLUENZA VIRUS VAC: HCPCS | Performed by: INTERNAL MEDICINE

## 2024-10-07 ENCOUNTER — TRANSCRIBE ORDERS (OUTPATIENT)
Dept: LAB | Facility: HOSPITAL | Age: 74
End: 2024-10-07

## 2024-10-07 ENCOUNTER — APPOINTMENT (OUTPATIENT)
Dept: LAB | Facility: HOSPITAL | Age: 74
End: 2024-10-07
Attending: OTOLARYNGOLOGY
Payer: MEDICARE

## 2024-10-07 DIAGNOSIS — J30.9 ALLERGIC RHINITIS, UNSPECIFIED: Primary | ICD-10-CM

## 2024-10-07 DIAGNOSIS — J30.9 ALLERGIC RHINITIS, UNSPECIFIED: ICD-10-CM

## 2024-10-07 LAB — T3 SERPL-MCNC: 97 NG/DL (ref 87–178)

## 2024-10-07 PROCEDURE — 86003 ALLG SPEC IGE CRUDE XTRC EA: CPT | Mod: GA

## 2024-10-07 PROCEDURE — 86003 ALLG SPEC IGE CRUDE XTRC EA: CPT | Mod: GA,59

## 2024-10-07 PROCEDURE — 82785 ASSAY OF IGE: CPT | Mod: GA

## 2024-10-07 PROCEDURE — 36415 COLL VENOUS BLD VENIPUNCTURE: CPT

## 2024-10-07 PROCEDURE — 84480 ASSAY TRIIODOTHYRONINE (T3): CPT

## 2024-10-10 ENCOUNTER — TELEPHONE (OUTPATIENT)
Dept: INTERNAL MEDICINE | Facility: CLINIC | Age: 74
End: 2024-10-10
Payer: MEDICARE

## 2024-10-10 DIAGNOSIS — M81.0 POST-MENOPAUSAL OSTEOPOROSIS: Primary | ICD-10-CM

## 2024-10-10 LAB
A ALTERNATA IGE QN: <0.1
AMER ROACH IGE QN: <0.1 KU/L
ASPERGILLUS FUMIGATUS ALLERGY (M3): <0.1
BERMUDA GRASS IGE QN: <0.1 KU/L
C HERBARUM IGE QN: <0.1 KU/L
CALIF WALNUT POLN IGE QN: 0.21 KU/L
CAT DANDER IGE QN: 0.1 KU/L
CMN PIGWEED IGE QN: <0.1 KU/L
COMMON RAGWEED IGE QN: <0.1 KU/L
COTTONWOOD IGE QN: <0.1 KU/L
D FARINAE IGE QN: 0.85 KU/L
D PTERONYSS IGE QN: <0.1
DOG DANDER IGE QN: <0.1
IGE SERPL-ACNC: 33 U/ML (ref 1–241)
MAPLE ALLERGY (T1): 0.11 KU/L
MOUNTAIN CEDAR ALLERGY (T6): <0.1
MOUSE URINE PROT IGE QN: <0.1 KU/L
MUGWORT IGE QN: <0.1 KU/L
OAK ALLERGY (T7): <0.1
PENICILLIUM NOTATUM ALLERGY (M1): <0.1
SHEEP SORREL IGE QN: <0.1 KU/L
SYCAMORE ALLERGY (T11): <0.1
TIMOTHY IGE QN: 0.12 KU/L
WHITE ASH IGE QN: 0.62 KU/L
WHITE ELM IGE QN: <0.1 KU/L
WHITE MULBERRY IGE QN: <0.1 KU/L

## 2024-10-10 NOTE — TELEPHONE ENCOUNTER
Patient called in needs a script placed in her chart. She has an appointment tomorrow at 9 am. Please advise.

## 2024-10-14 ENCOUNTER — TELEPHONE (OUTPATIENT)
Dept: INTERNAL MEDICINE | Facility: CLINIC | Age: 74
End: 2024-10-14
Payer: MEDICARE

## 2024-12-09 ENCOUNTER — TELEPHONE (OUTPATIENT)
Dept: INTERNAL MEDICINE | Facility: CLINIC | Age: 74
End: 2024-12-09

## 2024-12-09 ENCOUNTER — TELEMEDICINE (OUTPATIENT)
Dept: INTERNAL MEDICINE | Facility: CLINIC | Age: 74
End: 2024-12-09
Payer: MEDICARE

## 2024-12-09 DIAGNOSIS — R30.0 DYSURIA: Primary | ICD-10-CM

## 2024-12-09 PROCEDURE — 99213 OFFICE O/P EST LOW 20 MIN: CPT | Mod: 95 | Performed by: INTERNAL MEDICINE

## 2024-12-09 RX ORDER — NITROFURANTOIN 25; 75 MG/1; MG/1
100 CAPSULE ORAL 2 TIMES DAILY
Qty: 10 CAPSULE | Refills: 0 | Status: SHIPPED | OUTPATIENT
Start: 2024-12-09 | End: 2024-12-14

## 2024-12-09 ASSESSMENT — ENCOUNTER SYMPTOMS
WHEEZING: 0
CHEST TIGHTNESS: 0
CONFUSION: 0
WOUND: 0
NAUSEA: 0
NECK PAIN: 0
POLYDIPSIA: 0
SHORTNESS OF BREATH: 0
HEADACHES: 0
DYSURIA: 1
NERVOUS/ANXIOUS: 0
ADENOPATHY: 0
COUGH: 0
DIZZINESS: 0
RHINORRHEA: 0
FREQUENCY: 1
MYALGIAS: 0
VOMITING: 0
SORE THROAT: 0
CHILLS: 0
FEVER: 0
WEAKNESS: 0
BACK PAIN: 0
DIARRHEA: 0
BLOOD IN STOOL: 0
CONSTIPATION: 0
FATIGUE: 0
SINUS PRESSURE: 0
ABDOMINAL PAIN: 0
UNEXPECTED WEIGHT CHANGE: 0
HEMATURIA: 0
PALPITATIONS: 0
SLEEP DISTURBANCE: 0
EYE PAIN: 0

## 2024-12-09 NOTE — PROGRESS NOTES
Verification of Patient Location:  The patient affirms they are currently located in the following state: [place]    Are you in your home or a private residence? Yes    Request for Consent:    Audio Only Encounter   You and I are about to have a telemedicine check-in or visit. This is allowed because you have requested it. This telemedicine visit will be billed to your health insurance or you, if you are self-insured. You understand you will be responsible for any copayments or coinsurances that apply to your telemedicine visit. Before starting our telemedicine visit, I am required to get your consent for this virtual check-in or visit by telemedicine. Do you consent?    Patient Response to Request for Consent:  Yes      Visit Documentation:  Subjective     Patient ID: Whitney Ordaz is a 74 y.o. female.  1950      Pt c/o dysuria, freq and urgency since yesterday. Some gross blood in urine. No fevers. No abd or back pain. She doesnt remember having a uti before.         The following have been reviewed and updated as appropriate in this visit:   Allergies  Meds  Problems       Review of Systems   Constitutional:  Negative for chills, fatigue, fever and unexpected weight change.   HENT:  Negative for congestion, ear pain, hearing loss, rhinorrhea, sinus pressure and sore throat.    Eyes:  Negative for pain and visual disturbance.   Respiratory:  Negative for cough, chest tightness, shortness of breath and wheezing.    Cardiovascular:  Negative for chest pain, palpitations and leg swelling.   Gastrointestinal:  Negative for abdominal pain, blood in stool, constipation, diarrhea, nausea and vomiting.   Endocrine: Negative for cold intolerance, heat intolerance and polydipsia.   Genitourinary:  Positive for dysuria, frequency and urgency. Negative for hematuria.   Musculoskeletal:  Negative for back pain, myalgias and neck pain.   Skin:  Negative for rash and wound.   Allergic/Immunologic: Negative  for environmental allergies and food allergies.   Neurological:  Negative for dizziness, syncope, weakness and headaches.   Hematological:  Negative for adenopathy.   Psychiatric/Behavioral:  Negative for confusion and sleep disturbance. The patient is not nervous/anxious.      Past Medical History:   Diagnosis Date    Allergies     Arthritis     Diabetes (CMS/[facility])     DM2 (diabetes mellitus, type 2) (CMS/[facility]) 12/01/2020    Dupuytren's contracture of left hand 06/12/2024    Hand arthritis 12/15/2021    Hemorrhoids     History of partial hysterectomy 12/01/2020    Hx of appendectomy 12/01/2020    Hx of colonic polyps 12/01/2020    Hx of tonsillectomy 12/01/2020    Hyperlipidemia 12/01/2020    IBS (irritable bowel syndrome) 12/19/2022    Lipid disorder     Osteoarthritis of right knee 12/01/2020    Osteopenia 07/28/2022    Seasonal allergies 12/01/2020    Vitamin D deficiency 06/14/2021     Current Outpatient Medications on File Prior to Visit   Medication Sig Dispense Refill    CHOLECALCIFEROL, VITAMIN D3, ORAL Take by mouth daily.      diclofenac sodium (VOLTAREN) 1 % topical gel Apply topically as needed for mild pain.      ezetimibe (ZETIA) 10 mg tablet       inclisiran 284 mg/1.5 mL syringe Inject under the skin every 6 (six) months.      metFORMIN XR (GLUCOPHAGE-XR) 500 mg 24 hr tablet TAKE 1 TABLET BY MOUTH TWICE A  tablet 1    metoprolol succinate XL (TOPROL-XL) 25 mg 24 hr tablet Take 1 tablet (25 mg total) by mouth daily. 90 tablet 3     No current facility-administered medications on file prior to visit.     Allergies as of 12/09/2024 - Reviewed 12/09/2024   Allergen Reaction Noted    Atorvastatin  01/28/2020    Rosuvastatin calcium  07/28/2012    Sitagliptin  01/28/2020    Lidocaine hcl  01/08/2018    Glimepiride  09/14/2020    Penicillins  07/22/2009    Pioglitazone  01/28/2020    Procaine  07/22/2009         Assessment & Plan  Dysuria  Comment: hx suggests a simple uti; rx course of macrobid; she  will get uacs if not feleing better soon and f/u   Orders:    Urinalysis with Reflex Culture (ED and Outpatient only); Future      Time Spent:  I spent 15 minutes on this date of service performing the following activities: obtaining history, entering orders, documenting, preparing for visit, obtaining / reviewing records, and providing counseling and education.

## 2024-12-09 NOTE — TELEPHONE ENCOUNTER
T/C from patient complaining of UTI symptom sent PM earlier today, symptoms are as follows, dysuria, GH, Urgency/Frequency symptom onset was 12/08/2024.

## 2024-12-10 ENCOUNTER — APPOINTMENT (OUTPATIENT)
Dept: LAB | Age: 74
End: 2024-12-10
Attending: INTERNAL MEDICINE
Payer: MEDICARE

## 2024-12-10 DIAGNOSIS — E78.5 HYPERLIPIDEMIA, UNSPECIFIED HYPERLIPIDEMIA TYPE: ICD-10-CM

## 2024-12-10 DIAGNOSIS — E55.9 VITAMIN D DEFICIENCY: ICD-10-CM

## 2024-12-10 DIAGNOSIS — E11.9 TYPE 2 DIABETES MELLITUS WITHOUT COMPLICATION, WITHOUT LONG-TERM CURRENT USE OF INSULIN (CMS/HCC): ICD-10-CM

## 2024-12-10 LAB
25(OH)D3 SERPL-MCNC: 47 NG/ML (ref 30–100)
ALBUMIN SERPL-MCNC: 4.6 G/DL (ref 3.5–5.7)
ALP SERPL-CCNC: 70 IU/L (ref 34–125)
ALT SERPL-CCNC: 14 IU/L (ref 7–52)
ANION GAP SERPL CALC-SCNC: 12 MEQ/L (ref 3–15)
AST SERPL-CCNC: 14 IU/L (ref 13–39)
BASOPHILS # BLD: 0.06 K/UL (ref 0.01–0.1)
BASOPHILS NFR BLD: 0.7 %
BILIRUB SERPL-MCNC: 0.6 MG/DL (ref 0.3–1.2)
BUN SERPL-MCNC: 18 MG/DL (ref 7–25)
CALCIUM SERPL-MCNC: 10.3 MG/DL (ref 8.6–10.3)
CHLORIDE SERPL-SCNC: 104 MEQ/L (ref 98–107)
CHOLEST SERPL-MCNC: 167 MG/DL
CO2 SERPL-SCNC: 26 MEQ/L (ref 21–31)
CREAT SERPL-MCNC: 0.8 MG/DL (ref 0.6–1.2)
DIFFERENTIAL METHOD BLD: ABNORMAL
EGFRCR SERPLBLD CKD-EPI 2021: >60 ML/MIN/1.73M*2
EOSINOPHIL # BLD: 0.21 K/UL (ref 0.04–0.36)
EOSINOPHIL NFR BLD: 2.3 %
ERYTHROCYTE [DISTWIDTH] IN BLOOD BY AUTOMATED COUNT: 12.9 % (ref 11.7–14.4)
EST. AVERAGE GLUCOSE BLD GHB EST-MCNC: 148 MG/DL
GLUCOSE SERPL-MCNC: 176 MG/DL (ref 70–99)
HBA1C MFR BLD: 6.8 %
HCT VFR BLD AUTO: 42.2 % (ref 35–45)
HDLC SERPL-MCNC: 60 MG/DL
HDLC SERPL: 2.8 {RATIO}
HGB BLD-MCNC: 13.4 G/DL (ref 11.8–15.7)
IMM GRANULOCYTES # BLD AUTO: 0.02 K/UL (ref 0–0.08)
IMM GRANULOCYTES NFR BLD AUTO: 0.2 %
LDLC SERPL CALC-MCNC: 74 MG/DL
LYMPHOCYTES # BLD: 2.95 K/UL (ref 1.2–3.5)
LYMPHOCYTES NFR BLD: 32.8 %
MCH RBC QN AUTO: 29.2 PG (ref 28–33.2)
MCHC RBC AUTO-ENTMCNC: 31.8 G/DL (ref 32.2–35.5)
MCV RBC AUTO: 91.9 FL (ref 83–98)
MONOCYTES # BLD: 0.84 K/UL (ref 0.28–0.8)
MONOCYTES NFR BLD: 9.3 %
NEUTROPHILS # BLD: 4.92 K/UL (ref 1.7–7)
NEUTS SEG NFR BLD: 54.7 %
NONHDLC SERPL-MCNC: 107 MG/DL
NRBC BLD-RTO: 0 %
PLATELET # BLD AUTO: 320 K/UL (ref 150–369)
PMV BLD AUTO: 9.1 FL (ref 9.4–12.3)
POTASSIUM SERPL-SCNC: 4.3 MEQ/L (ref 3.5–5.1)
PROT SERPL-MCNC: 7.2 G/DL (ref 6–8.2)
RBC # BLD AUTO: 4.59 M/UL (ref 3.93–5.22)
SODIUM SERPL-SCNC: 142 MEQ/L (ref 136–145)
TRIGL SERPL-MCNC: 165 MG/DL
TSH SERPL DL<=0.05 MIU/L-ACNC: 1.72 MIU/L (ref 0.34–5.6)
WBC # BLD AUTO: 9 K/UL (ref 3.8–10.5)

## 2024-12-10 PROCEDURE — 83036 HEMOGLOBIN GLYCOSYLATED A1C: CPT

## 2024-12-10 PROCEDURE — 80061 LIPID PANEL: CPT

## 2024-12-10 PROCEDURE — 82306 VITAMIN D 25 HYDROXY: CPT

## 2024-12-10 PROCEDURE — 36415 COLL VENOUS BLD VENIPUNCTURE: CPT

## 2024-12-10 PROCEDURE — 84443 ASSAY THYROID STIM HORMONE: CPT

## 2024-12-10 PROCEDURE — 80053 COMPREHEN METABOLIC PANEL: CPT

## 2024-12-10 PROCEDURE — 85025 COMPLETE CBC W/AUTO DIFF WBC: CPT

## 2024-12-11 ENCOUNTER — TELEPHONE (OUTPATIENT)
Dept: INTERNAL MEDICINE | Facility: CLINIC | Age: 74
End: 2024-12-11
Payer: MEDICARE

## 2024-12-12 ENCOUNTER — OFFICE VISIT (OUTPATIENT)
Dept: INTERNAL MEDICINE | Facility: CLINIC | Age: 74
End: 2024-12-12
Payer: MEDICARE

## 2024-12-12 VITALS
SYSTOLIC BLOOD PRESSURE: 110 MMHG | HEIGHT: 61 IN | HEART RATE: 92 BPM | OXYGEN SATURATION: 99 % | WEIGHT: 105 LBS | DIASTOLIC BLOOD PRESSURE: 60 MMHG | BODY MASS INDEX: 19.83 KG/M2 | RESPIRATION RATE: 16 BRPM

## 2024-12-12 DIAGNOSIS — E78.5 HYPERLIPIDEMIA, UNSPECIFIED HYPERLIPIDEMIA TYPE: ICD-10-CM

## 2024-12-12 DIAGNOSIS — K58.9 IRRITABLE BOWEL SYNDROME, UNSPECIFIED TYPE: ICD-10-CM

## 2024-12-12 DIAGNOSIS — M19.049 HAND ARTHRITIS: ICD-10-CM

## 2024-12-12 DIAGNOSIS — Z86.0100 HX OF COLONIC POLYPS: ICD-10-CM

## 2024-12-12 DIAGNOSIS — Z12.31 ENCOUNTER FOR SCREENING MAMMOGRAM FOR MALIGNANT NEOPLASM OF BREAST: ICD-10-CM

## 2024-12-12 DIAGNOSIS — E11.9 TYPE 2 DIABETES MELLITUS WITHOUT COMPLICATION, WITHOUT LONG-TERM CURRENT USE OF INSULIN (CMS/HCC): Primary | ICD-10-CM

## 2024-12-12 PROCEDURE — G2211 COMPLEX E/M VISIT ADD ON: HCPCS | Performed by: INTERNAL MEDICINE

## 2024-12-12 PROCEDURE — 99214 OFFICE O/P EST MOD 30 MIN: CPT | Performed by: INTERNAL MEDICINE

## 2024-12-12 ASSESSMENT — ENCOUNTER SYMPTOMS
SLEEP DISTURBANCE: 0
MYALGIAS: 0
DYSURIA: 0
UNEXPECTED WEIGHT CHANGE: 0
CONSTIPATION: 0
COUGH: 0
DIZZINESS: 0
WHEEZING: 0
SINUS PRESSURE: 0
POLYDIPSIA: 0
WOUND: 0
RHINORRHEA: 0
HEMATURIA: 0
NAUSEA: 0
SHORTNESS OF BREATH: 0
PALPITATIONS: 0
EYE PAIN: 0
FEVER: 0
HEADACHES: 0
VOMITING: 0
SORE THROAT: 0
BACK PAIN: 0
NERVOUS/ANXIOUS: 0
CHILLS: 0
CONFUSION: 0
WEAKNESS: 0
ADENOPATHY: 0
DIARRHEA: 0
FATIGUE: 0
CHEST TIGHTNESS: 0
ARTHRALGIAS: 1
BLOOD IN STOOL: 0
ABDOMINAL PAIN: 0
FREQUENCY: 0
NECK PAIN: 0

## 2024-12-12 ASSESSMENT — PATIENT HEALTH QUESTIONNAIRE - PHQ9: SUM OF ALL RESPONSES TO PHQ9 QUESTIONS 1 & 2: 0

## 2024-12-12 NOTE — PROGRESS NOTES
Subjective      Patient ID: Whitney Ordaz is a 74 y.o. female.    Here for routine f/u exam. Hx reviewd. She has been gen well.  Aches and pain in her back and hands. Otherwise well. Walks for exercise. No chest symptoms.  Compliant with meds.         The following have been reviewed and updated as appropriate in this visit:   Allergies  Meds  Problems       Review of Systems   Constitutional:  Negative for chills, fatigue, fever and unexpected weight change.   HENT:  Negative for congestion, ear pain, hearing loss, rhinorrhea, sinus pressure and sore throat.    Eyes:  Negative for pain and visual disturbance.   Respiratory:  Negative for cough, chest tightness, shortness of breath and wheezing.    Cardiovascular:  Negative for chest pain, palpitations and leg swelling.   Gastrointestinal:  Negative for abdominal pain, blood in stool, constipation, diarrhea, nausea and vomiting.   Endocrine: Negative for cold intolerance, heat intolerance and polydipsia.   Genitourinary:  Negative for dysuria, frequency, hematuria and urgency.   Musculoskeletal:  Positive for arthralgias. Negative for back pain, myalgias and neck pain.   Skin:  Negative for rash and wound.   Allergic/Immunologic: Negative for environmental allergies and food allergies.   Neurological:  Negative for dizziness, syncope, weakness and headaches.   Hematological:  Negative for adenopathy.   Psychiatric/Behavioral:  Negative for confusion and sleep disturbance. The patient is not nervous/anxious.          Current Outpatient Medications:     CHOLECALCIFEROL, VITAMIN D3, ORAL, Take by mouth daily., Disp: , Rfl:     diclofenac sodium (VOLTAREN) 1 % topical gel, Apply topically as needed for mild pain., Disp: , Rfl:     ezetimibe (ZETIA) 10 mg tablet, , Disp: , Rfl:     inclisiran 284 mg/1.5 mL syringe, Inject under the skin every 6 (six) months., Disp: , Rfl:     metFORMIN XR (GLUCOPHAGE-XR) 500 mg 24 hr tablet, TAKE 1 TABLET BY MOUTH TWICE A  "DAY, Disp: 180 tablet, Rfl: 1    metoprolol succinate XL (TOPROL-XL) 25 mg 24 hr tablet, Take 1 tablet (25 mg total) by mouth daily., Disp: 90 tablet, Rfl: 3    nitrofurantoin, macrocrystal-monohydrate, (MACROBID) 100 mg capsule, Take 1 capsule (100 mg total) by mouth 2 (two) times a day for 5 days., Disp: 10 capsule, Rfl: 0    Allergies:  Atorvastatin, Rosuvastatin calcium, Sitagliptin, Lidocaine hcl, Glimepiride, Penicillins, Pioglitazone, and Procaine    Past Medical History:   Diagnosis Date    Allergies     Arthritis     Diabetes (CMS/[facility])     DM2 (diabetes mellitus, type 2) (CMS/[facility]) 12/01/2020    Dupuytren's contracture of left hand 06/12/2024    Hand arthritis 12/15/2021    Hemorrhoids     History of partial hysterectomy 12/01/2020    Hx of appendectomy 12/01/2020    Hx of colonic polyps 12/01/2020    Hx of tonsillectomy 12/01/2020    Hyperlipidemia 12/01/2020    IBS (irritable bowel syndrome) 12/19/2022    Lipid disorder     Osteoarthritis of right knee 12/01/2020    Osteopenia 07/28/2022    Seasonal allergies 12/01/2020    Vitamin D deficiency 06/14/2021       Past Surgical History   Procedure Laterality Date    Appendectomy      Hysterectomy      Tonsillectomy         Social History     Tobacco Use    Smoking status: Never    Smokeless tobacco: Never   Vaping Use    Vaping status: Never Used   Substance Use Topics    Alcohol use: Not Currently    Drug use: Never       Objective     Vitals:    12/12/24 0942   BP: 110/60   BP Location: Right upper arm   Patient Position: Sitting   Pulse: 92   Resp: 16   SpO2: 99%   Weight: 47.6 kg (105 lb)   Height: 1.549 m (5' 1\")       Physical Exam  Constitutional:       Appearance: She is well-developed.   HENT:      Head: Normocephalic and atraumatic.      Right Ear: External ear normal.      Left Ear: External ear normal.      Nose: Nose normal.   Eyes:      Conjunctiva/sclera: Conjunctivae normal.      Pupils: Pupils are equal, round, and reactive to light. "   Cardiovascular:      Rate and Rhythm: Normal rate and regular rhythm.      Heart sounds: Normal heart sounds. No murmur heard.  Pulmonary:      Effort: Pulmonary effort is normal.      Breath sounds: Normal breath sounds. No wheezing.   Abdominal:      Palpations: Abdomen is soft.      Tenderness: There is no abdominal tenderness.   Musculoskeletal:         General: No deformity.      Cervical back: Neck supple.   Skin:     General: Skin is warm and dry.      Findings: No rash.   Neurological:      Mental Status: She is alert and oriented to person, place, and time.   Psychiatric:         Mood and Affect: Mood is not depressed.         Behavior: Behavior normal.         Thought Content: Thought content normal.         Assessment/Plan   Type 2 diabetes mellitus without complication, without long-term current use of insulin (CMS/[facility])  (primary encounter diagnosis)  Comment: controlled; optho per routine  Plan: Hemoglobin A1c, Microalbumin/Creatinine Ur         Random    Hyperlipidemia, unspecified hyperlipidemia type  Comment: cont zetia    Hand arthritis  Comment: bothersoem but tolerabel for now    Irritable bowel syndrome, unspecified type  Comment: stable; tolerable    Hx of colonic polyps  Comment: due for f/u colo next yr, she will call to sched    Encounter for screening mammogram for malignant neoplasm of breast  Comment: mammo in june  Plan: BI SCREENING MAMMOGRAM BILATERAL(TOMOSYNTHESIS)    HM  Pt appears well; labs reviewed; dexa done; rec shingris; f/u 6 months for a physical or prn    I attest that this visit supports the complexity inherent to evaluation and management associated with medical care services that serve as the continuing focal point for all needed health care services and/or medical care services that are part of ongoing care related to this patient's single, serious condition or a complex condition.          Porfirio Soto MD    12/12/2024

## 2025-01-07 ENCOUNTER — TRANSCRIBE ORDERS (OUTPATIENT)
Dept: SCHEDULING | Age: 75
End: 2025-01-07

## 2025-01-07 DIAGNOSIS — M50.30 OTHER CERVICAL DISC DEGENERATION, UNSPECIFIED CERVICAL REGION: ICD-10-CM

## 2025-01-07 DIAGNOSIS — M51.360 OTHER INTERVERTEBRAL DISC DEGENERATION, LUMBAR REGION WITH DISCOGENIC BACK PAIN ONLY: Primary | ICD-10-CM

## 2025-01-08 RX ORDER — METFORMIN HYDROCHLORIDE 500 MG/1
TABLET, EXTENDED RELEASE ORAL
Qty: 180 TABLET | Refills: 1 | Status: SHIPPED | OUTPATIENT
Start: 2025-01-08

## 2025-01-13 DIAGNOSIS — R00.2 HEART PALPITATIONS: Chronic | ICD-10-CM

## 2025-01-15 RX ORDER — METOPROLOL SUCCINATE 25 MG/1
25 TABLET, EXTENDED RELEASE ORAL DAILY
Qty: 90 TABLET | Refills: 3 | Status: SHIPPED | OUTPATIENT
Start: 2025-01-15

## 2025-01-22 ENCOUNTER — HOSPITAL ENCOUNTER (OUTPATIENT)
Dept: RADIOLOGY | Age: 75
Discharge: HOME | End: 2025-01-22
Attending: PHYSICAL MEDICINE & REHABILITATION
Payer: MEDICARE

## 2025-01-22 DIAGNOSIS — M51.360 OTHER INTERVERTEBRAL DISC DEGENERATION, LUMBAR REGION WITH DISCOGENIC BACK PAIN ONLY: ICD-10-CM

## 2025-01-22 DIAGNOSIS — M50.30 OTHER CERVICAL DISC DEGENERATION, UNSPECIFIED CERVICAL REGION: ICD-10-CM

## 2025-01-23 ENCOUNTER — APPOINTMENT (OUTPATIENT)
Dept: LAB | Facility: HOSPITAL | Age: 75
End: 2025-01-23
Attending: INTERNAL MEDICINE
Payer: MEDICARE

## 2025-01-23 DIAGNOSIS — E11.9 TYPE 2 DIABETES MELLITUS WITHOUT COMPLICATION, WITHOUT LONG-TERM CURRENT USE OF INSULIN (CMS/HCC): ICD-10-CM

## 2025-01-23 LAB
CREAT UR-MCNC: 12.6 MG/DL
MICROALBUMIN UR-MCNC: <2 MG/L
MICROALBUMIN/CREAT UR: NORMAL MG/G{CREAT}

## 2025-01-23 PROCEDURE — 82043 UR ALBUMIN QUANTITATIVE: CPT

## 2025-01-24 RX ORDER — NITROFURANTOIN 25; 75 MG/1; MG/1
100 CAPSULE ORAL 2 TIMES DAILY
Qty: 10 CAPSULE | Refills: 0 | Status: SHIPPED | OUTPATIENT
Start: 2025-01-24 | End: 2025-01-29

## 2025-02-06 DIAGNOSIS — R30.0 DYSURIA: Primary | ICD-10-CM

## 2025-02-06 RX ORDER — SULFAMETHOXAZOLE AND TRIMETHOPRIM 800; 160 MG/1; MG/1
1 TABLET ORAL 2 TIMES DAILY
Qty: 6 TABLET | Refills: 0 | Status: SHIPPED | OUTPATIENT
Start: 2025-02-06 | End: 2025-02-09

## 2025-02-07 ENCOUNTER — TELEPHONE (OUTPATIENT)
Dept: INTERNAL MEDICINE | Facility: CLINIC | Age: 75
End: 2025-02-07

## 2025-02-07 ENCOUNTER — APPOINTMENT (OUTPATIENT)
Dept: LAB | Facility: HOSPITAL | Age: 75
End: 2025-02-07
Attending: INTERNAL MEDICINE
Payer: MEDICARE

## 2025-02-07 DIAGNOSIS — R30.0 DYSURIA: ICD-10-CM

## 2025-02-07 LAB
BILIRUB UR QL STRIP.AUTO: NEGATIVE MG/DL
CLARITY UR REFRACT.AUTO: CLEAR
COLOR UR AUTO: YELLOW
GLUCOSE UR STRIP.AUTO-MCNC: NEGATIVE MG/DL
HGB UR QL STRIP.AUTO: NEGATIVE
KETONES UR STRIP.AUTO-MCNC: NEGATIVE MG/DL
LEUKOCYTE ESTERASE UR QL STRIP.AUTO: NEGATIVE
NITRITE UR QL STRIP.AUTO: NEGATIVE
PH UR STRIP.AUTO: 5.5 [PH]
PROT UR QL STRIP.AUTO: NEGATIVE
SP GR UR REFRACT.AUTO: 1.01
UROBILINOGEN UR STRIP-ACNC: 0.2 EU/DL

## 2025-02-07 PROCEDURE — 81003 URINALYSIS AUTO W/O SCOPE: CPT

## 2025-02-07 NOTE — TELEPHONE ENCOUNTER
Info to Urology provided per PCP's request.     Dr. Justus Wesley   Address: [address] [place] [address] [address], [place] [address]  Hours: Open ? Closes 5?PM  Phone: [phone]    Dr. Husam Perea  Address: [address] [place] [address] [place] [id number]  Hours: Open ? Closes 4?PM  Phone: [phone]

## 2025-02-13 ENCOUNTER — TRANSCRIBE ORDERS (OUTPATIENT)
Dept: RADIOLOGY | Age: 75
End: 2025-02-13

## 2025-02-13 ENCOUNTER — HOSPITAL ENCOUNTER (OUTPATIENT)
Dept: RADIOLOGY | Age: 75
Discharge: HOME | End: 2025-02-13
Attending: INTERNAL MEDICINE
Payer: MEDICARE

## 2025-02-13 DIAGNOSIS — R06.00 DYSPNEA, UNSPECIFIED: ICD-10-CM

## 2025-02-13 DIAGNOSIS — R06.00 DYSPNEA, UNSPECIFIED: Primary | ICD-10-CM

## 2025-02-13 PROCEDURE — 71046 X-RAY EXAM CHEST 2 VIEWS: CPT

## 2025-04-05 ENCOUNTER — TRANSCRIBE ORDERS (OUTPATIENT)
Dept: LAB | Facility: HOSPITAL | Age: 75
End: 2025-04-05

## 2025-04-05 ENCOUNTER — APPOINTMENT (OUTPATIENT)
Dept: LAB | Facility: HOSPITAL | Age: 75
End: 2025-04-05
Attending: INTERNAL MEDICINE
Payer: MEDICARE

## 2025-04-05 DIAGNOSIS — E11.9 DIABETES MELLITUS (CMS/HCC): Primary | ICD-10-CM

## 2025-04-05 DIAGNOSIS — E11.9 DIABETES MELLITUS (CMS/HCC): ICD-10-CM

## 2025-04-05 LAB
ALBUMIN SERPL-MCNC: 4.6 G/DL (ref 3.5–5.7)
ALP SERPL-CCNC: 60 IU/L (ref 34–125)
ALT SERPL-CCNC: 17 IU/L (ref 7–52)
ANION GAP SERPL CALC-SCNC: 9 MEQ/L (ref 3–15)
AST SERPL-CCNC: 18 IU/L (ref 13–39)
BILIRUB SERPL-MCNC: 0.5 MG/DL (ref 0.3–1.2)
BUN SERPL-MCNC: 18 MG/DL (ref 7–25)
CALCIUM SERPL-MCNC: 10.1 MG/DL (ref 8.6–10.3)
CHLORIDE SERPL-SCNC: 102 MEQ/L (ref 98–107)
CHOLEST SERPL-MCNC: 177 MG/DL
CO2 SERPL-SCNC: 29 MEQ/L (ref 21–31)
CREAT SERPL-MCNC: 0.8 MG/DL (ref 0.6–1.2)
EGFRCR SERPLBLD CKD-EPI 2021: >60 ML/MIN/1.73M*2
GLUCOSE SERPL-MCNC: 144 MG/DL (ref 70–99)
HDLC SERPL-MCNC: 63 MG/DL
HDLC SERPL: 2.8 {RATIO}
LDLC SERPL CALC-MCNC: 72 MG/DL
NONHDLC SERPL-MCNC: 114 MG/DL
POTASSIUM SERPL-SCNC: 4.7 MEQ/L (ref 3.5–5.1)
PROT SERPL-MCNC: 7.2 G/DL (ref 6–8.2)
SODIUM SERPL-SCNC: 140 MEQ/L (ref 136–145)
TRIGL SERPL-MCNC: 212 MG/DL

## 2025-04-05 PROCEDURE — 80061 LIPID PANEL: CPT

## 2025-04-05 PROCEDURE — 83036 HEMOGLOBIN GLYCOSYLATED A1C: CPT

## 2025-04-05 PROCEDURE — 36415 COLL VENOUS BLD VENIPUNCTURE: CPT

## 2025-04-05 PROCEDURE — 80053 COMPREHEN METABOLIC PANEL: CPT

## 2025-04-05 PROCEDURE — 82172 ASSAY OF APOLIPOPROTEIN: CPT

## 2025-04-06 LAB — APO B SERPL-MCNC: 85 MG/DL

## 2025-04-07 LAB
EST. AVERAGE GLUCOSE BLD GHB EST-MCNC: 157 MG/DL
HBA1C MFR BLD: 7.1 %

## 2025-04-22 ENCOUNTER — CLINICAL SUPPORT (OUTPATIENT)
Dept: INTERNAL MEDICINE | Facility: CLINIC | Age: 75
End: 2025-04-22
Payer: MEDICARE

## 2025-04-22 DIAGNOSIS — Z23 NEED FOR COVID-19 VACCINE: Primary | ICD-10-CM

## 2025-04-22 PROCEDURE — 90480 ADMN SARSCOV2 VAC 1/ONLY CMP: CPT | Performed by: INTERNAL MEDICINE

## 2025-04-22 PROCEDURE — 91322 SARSCOV2 VAC 50 MCG/0.5ML IM: CPT | Performed by: INTERNAL MEDICINE

## 2025-04-28 DIAGNOSIS — Z12.31 ENCOUNTER FOR SCREENING MAMMOGRAM FOR MALIGNANT NEOPLASM OF BREAST: Primary | ICD-10-CM

## 2025-05-02 ENCOUNTER — HOSPITAL ENCOUNTER (OUTPATIENT)
Dept: RADIOLOGY | Age: 75
Discharge: HOME | End: 2025-05-02
Attending: PHYSICAL MEDICINE & REHABILITATION
Payer: MEDICARE

## 2025-05-02 ENCOUNTER — TRANSCRIBE ORDERS (OUTPATIENT)
Dept: REGISTRATION | Age: 75
End: 2025-05-02

## 2025-05-02 DIAGNOSIS — M50.30 DEGENERATION OF CERVICAL INTERVERTEBRAL DISC: ICD-10-CM

## 2025-05-02 DIAGNOSIS — M51.360 DISCOGENIC SYNDROME, LUMBAR: ICD-10-CM

## 2025-05-02 DIAGNOSIS — M50.30 DEGENERATION OF CERVICAL INTERVERTEBRAL DISC: Primary | ICD-10-CM

## 2025-05-02 PROCEDURE — 72114 X-RAY EXAM L-S SPINE BENDING: CPT

## 2025-05-21 ENCOUNTER — OFFICE VISIT (OUTPATIENT)
Dept: INTERNAL MEDICINE | Facility: CLINIC | Age: 75
End: 2025-05-21
Payer: MEDICARE

## 2025-05-21 ENCOUNTER — TELEPHONE (OUTPATIENT)
Dept: INTERNAL MEDICINE | Facility: CLINIC | Age: 75
End: 2025-05-21

## 2025-05-21 VITALS
BODY MASS INDEX: 20.2 KG/M2 | HEIGHT: 61 IN | HEART RATE: 83 BPM | DIASTOLIC BLOOD PRESSURE: 66 MMHG | WEIGHT: 107 LBS | SYSTOLIC BLOOD PRESSURE: 116 MMHG | OXYGEN SATURATION: 99 % | RESPIRATION RATE: 16 BRPM

## 2025-05-21 DIAGNOSIS — L30.4 INTERTRIGO: Primary | ICD-10-CM

## 2025-05-21 DIAGNOSIS — E11.9 TYPE 2 DIABETES MELLITUS WITHOUT COMPLICATION, WITHOUT LONG-TERM CURRENT USE OF INSULIN (CMS/HCC): ICD-10-CM

## 2025-05-21 PROCEDURE — 99213 OFFICE O/P EST LOW 20 MIN: CPT | Performed by: INTERNAL MEDICINE

## 2025-05-21 RX ORDER — CLOTRIMAZOLE AND BETAMETHASONE DIPROPIONATE 10; .64 MG/G; MG/G
CREAM TOPICAL 2 TIMES DAILY
Qty: 45 G | Refills: 1 | Status: SHIPPED | OUTPATIENT
Start: 2025-05-21 | End: 2025-06-20

## 2025-06-16 SDOH — ECONOMIC STABILITY: TRANSPORTATION INSECURITY
IN THE PAST 12 MONTHS, HAS THE LACK OF TRANSPORTATION KEPT YOU FROM MEDICAL APPOINTMENTS OR FROM GETTING MEDICATIONS?: NO

## 2025-06-16 SDOH — ECONOMIC STABILITY: FOOD INSECURITY: WITHIN THE PAST 12 MONTHS, YOU WORRIED THAT YOUR FOOD WOULD RUN OUT BEFORE YOU GOT MONEY TO BUY MORE.: NEVER TRUE

## 2025-06-16 SDOH — ECONOMIC STABILITY: FOOD INSECURITY: WITHIN THE PAST 12 MONTHS, THE FOOD YOU BOUGHT JUST DIDN'T LAST AND YOU DIDN'T HAVE MONEY TO GET MORE.: NEVER TRUE

## 2025-06-16 SDOH — ECONOMIC STABILITY: INCOME INSECURITY: IN THE LAST 12 MONTHS, WAS THERE A TIME WHEN YOU WERE NOT ABLE TO PAY THE MORTGAGE OR RENT ON TIME?: NO

## 2025-06-16 SDOH — ECONOMIC STABILITY: TRANSPORTATION INSECURITY
IN THE PAST 12 MONTHS, HAS LACK OF TRANSPORTATION KEPT YOU FROM MEETINGS, WORK, OR FROM GETTING THINGS NEEDED FOR DAILY LIVING?: NO

## 2025-06-16 ASSESSMENT — SOCIAL DETERMINANTS OF HEALTH (SDOH): IN THE PAST 12 MONTHS, HAS THE ELECTRIC, GAS, OIL, OR WATER COMPANY THREATENED TO SHUT OFF SERVICE IN YOUR HOME?: NO

## 2025-06-18 ENCOUNTER — OFFICE VISIT (OUTPATIENT)
Dept: INTERNAL MEDICINE | Facility: CLINIC | Age: 75
End: 2025-06-18
Payer: MEDICARE

## 2025-06-18 VITALS
BODY MASS INDEX: 20.39 KG/M2 | DIASTOLIC BLOOD PRESSURE: 68 MMHG | HEART RATE: 53 BPM | OXYGEN SATURATION: 97 % | WEIGHT: 108 LBS | HEIGHT: 61 IN | RESPIRATION RATE: 16 BRPM | SYSTOLIC BLOOD PRESSURE: 118 MMHG

## 2025-06-18 DIAGNOSIS — E55.9 VITAMIN D DEFICIENCY: ICD-10-CM

## 2025-06-18 DIAGNOSIS — K58.9 IRRITABLE BOWEL SYNDROME, UNSPECIFIED TYPE: ICD-10-CM

## 2025-06-18 DIAGNOSIS — M19.049 ARTHRITIS OF HAND, UNSPECIFIED LATERALITY: ICD-10-CM

## 2025-06-18 DIAGNOSIS — E11.9 TYPE 2 DIABETES MELLITUS WITHOUT COMPLICATION, WITHOUT LONG-TERM CURRENT USE OF INSULIN (CMS/HCC): Primary | ICD-10-CM

## 2025-06-18 DIAGNOSIS — M85.80 OSTEOPENIA, UNSPECIFIED LOCATION: ICD-10-CM

## 2025-06-18 DIAGNOSIS — E78.5 HYPERLIPIDEMIA, UNSPECIFIED HYPERLIPIDEMIA TYPE: ICD-10-CM

## 2025-06-18 DIAGNOSIS — Z86.0100 HX OF COLONIC POLYPS: ICD-10-CM

## 2025-06-18 PROCEDURE — G2211 COMPLEX E/M VISIT ADD ON: HCPCS | Performed by: INTERNAL MEDICINE

## 2025-06-18 PROCEDURE — 99214 OFFICE O/P EST MOD 30 MIN: CPT | Performed by: INTERNAL MEDICINE

## 2025-06-18 RX ORDER — ALBUTEROL SULFATE 90 UG/1
INHALANT RESPIRATORY (INHALATION)
COMMUNITY
Start: 2025-06-03

## 2025-06-18 ASSESSMENT — ENCOUNTER SYMPTOMS
VOMITING: 0
FREQUENCY: 0
NAUSEA: 0
POLYDIPSIA: 0
BACK PAIN: 0
ADENOPATHY: 0
FATIGUE: 0
SORE THROAT: 0
ARTHRALGIAS: 1
UNEXPECTED WEIGHT CHANGE: 0
DYSURIA: 0
HEMATURIA: 0
SLEEP DISTURBANCE: 0
HEADACHES: 0
RHINORRHEA: 0
ABDOMINAL PAIN: 0
COUGH: 0
WHEEZING: 0
CONSTIPATION: 0
DIARRHEA: 0
WEAKNESS: 0
CONFUSION: 0
BLOOD IN STOOL: 0
EYE PAIN: 0
CHILLS: 0
PALPITATIONS: 0
CHEST TIGHTNESS: 0
WOUND: 0
SINUS PRESSURE: 0
FEVER: 0
NERVOUS/ANXIOUS: 0
DIZZINESS: 0
MYALGIAS: 0
SHORTNESS OF BREATH: 0
NECK PAIN: 0

## 2025-06-18 NOTE — PROGRESS NOTES
Subjective      Patient ID: Whitney Ordaz is a 75 y.o. female.    Here for annual exam. Hx reviewed. She has been gen well.  Aches and pains in her back and hands from arthritis. Otherwise well. Walks for exercise. No chest symptoms. Weight stable. Compliant with meds.         The following have been reviewed and updated as appropriate in this visit:   Allergies  Meds  Problems       Review of Systems   Constitutional:  Negative for chills, fatigue, fever and unexpected weight change.   HENT:  Negative for congestion, ear pain, hearing loss, rhinorrhea, sinus pressure and sore throat.    Eyes:  Negative for pain and visual disturbance.   Respiratory:  Negative for cough, chest tightness, shortness of breath and wheezing.    Cardiovascular:  Negative for chest pain, palpitations and leg swelling.   Gastrointestinal:  Negative for abdominal pain, blood in stool, constipation, diarrhea, nausea and vomiting.   Endocrine: Negative for cold intolerance, heat intolerance and polydipsia.   Genitourinary:  Negative for dysuria, frequency, hematuria and urgency.   Musculoskeletal:  Positive for arthralgias. Negative for back pain, myalgias and neck pain.   Skin:  Negative for rash and wound.   Allergic/Immunologic: Negative for environmental allergies and food allergies.   Neurological:  Negative for dizziness, syncope, weakness and headaches.   Hematological:  Negative for adenopathy.   Psychiatric/Behavioral:  Negative for confusion and sleep disturbance. The patient is not nervous/anxious.          Current Outpatient Medications:     albuterol HFA 90 mcg/actuation inhaler, TAKE 2 PUFFS BY MOUTHG EVERY 4 TO 6 HOURS AS NEEDED, Disp: , Rfl:     ezetimibe (ZETIA) 10 mg tablet, , Disp: , Rfl:     metFORMIN XR (GLUCOPHAGE-XR) 500 mg 24 hr tablet, TAKE 1 TABLET BY MOUTH TWICE A DAY, Disp: 180 tablet, Rfl: 1    metoprolol succinate XL (TOPROL-XL) 25 mg 24 hr tablet, Take 1 tablet (25 mg total) by mouth daily., Disp: 90  "tablet, Rfl: 3    CHOLECALCIFEROL, VITAMIN D3, ORAL, Take by mouth daily., Disp: , Rfl:     clotrimazole-betamethasone (LOTRISONE) 1-0.05 % cream, Apply topically 2 (two) times a day., Disp: 45 g, Rfl: 1    diclofenac sodium (VOLTAREN) 1 % topical gel, Apply topically as needed for mild pain., Disp: , Rfl:     inclisiran 284 mg/1.5 mL syringe, Inject under the skin every 6 (six) months., Disp: , Rfl:     Allergies:  Atorvastatin, Rosuvastatin calcium, Sitagliptin, Lidocaine hcl, Glimepiride, Penicillins, Pioglitazone, and Procaine    Past Medical History:   Diagnosis Date    Allergies     Arthritis     Asthma     Diabetes (CMS/[facility])     DM2 (diabetes mellitus, type 2) (CMS/[facility]) 12/01/2020    Dupuytren's contracture of left hand 06/12/2024    Hand arthritis 12/15/2021    Hemorrhoids     History of partial hysterectomy 12/01/2020    Hx of appendectomy 12/01/2020    Hx of colonic polyps 12/01/2020    Hx of tonsillectomy 12/01/2020    Hyperlipidemia 12/01/2020    IBS (irritable bowel syndrome) 12/19/2022    Lipid disorder     Osteoarthritis of right knee 12/01/2020    Osteopenia 07/28/2022    Seasonal allergies 12/01/2020    Vitamin D deficiency 06/14/2021       Past Surgical History   Procedure Laterality Date    Appendectomy      Hysterectomy      Tonsillectomy         Social History     Tobacco Use    Smoking status: Never    Smokeless tobacco: Never   Vaping Use    Vaping status: Never Used   Substance Use Topics    Alcohol use: Not Currently    Drug use: Never       Objective     Vitals:    06/18/25 1056   BP: 118/68   BP Location: Right upper arm   Patient Position: Sitting   Pulse: (!) 53   Resp: 16   SpO2: 97%   Weight: 49 kg (108 lb)   Height: 1.549 m (5' 1\")       Physical Exam  Constitutional:       Appearance: She is well-developed.   HENT:      Head: Normocephalic and atraumatic.      Right Ear: External ear normal.      Left Ear: External ear normal.      Nose: Nose normal.   Eyes:      Conjunctiva/sclera: " Conjunctivae normal.      Pupils: Pupils are equal, round, and reactive to light.   Neck:      Vascular: No carotid bruit.   Cardiovascular:      Rate and Rhythm: Normal rate and regular rhythm.      Heart sounds: Normal heart sounds. No murmur heard.  Pulmonary:      Effort: Pulmonary effort is normal.      Breath sounds: Normal breath sounds. No wheezing.   Abdominal:      General: Bowel sounds are normal.      Palpations: Abdomen is soft.      Tenderness: There is no abdominal tenderness.   Musculoskeletal:         General: No deformity.      Cervical back: Neck supple.   Skin:     General: Skin is warm and dry.      Findings: No rash.   Neurological:      Mental Status: She is alert and oriented to person, place, and time.   Psychiatric:         Mood and Affect: Mood is not depressed.         Behavior: Behavior normal.         Thought Content: Thought content normal.         Assessment/Plan   Type 2 diabetes mellitus without complication, without long-term current use of insulin (CMS/[facility])  (primary encounter diagnosis)  Comment: recent a1c is acceptable; cont meds; optho per routine  Plan: Hemoglobin A1c, Microalbumin/Creatinine Ur         Random    Hyperlipidemia, unspecified hyperlipidemia type  Comment: cont meds; cards is dr gonzalez; she had very low brendan score in 2020; no chest symptoms  Plan: CBC and Differential, Lipid panel,         Comprehensive metabolic panel, TSH 3rd         Generation    Osteopenia, unspecified location  Comment: cont healthy diet and reg exercise    Hx of colonic polyps  Comment: colo 2022 with dr live; f/u 3 yrs; she will sched    Irritable bowel syndrome, unspecified type  Comment: this is stable and not too bothersome it seems    Arthritis of hand, unspecified laterality  Comment: we dis conservative care; ortho is dr campo    Vitamin D deficiency  Comment: cont supplement  Plan: Vitamin D 25 hydroxy    HM  Pt appears well; labs reviewed; disc rec vaccines; mammo due, dexa  done (ely); f/u 6 months with labs     I attest that this visit supports the complexity inherent to evaluation and management associated with medical care services that serve as the continuing focal point for all needed health care services and/or medical care services that are part of ongoing care related to this patient's single, serious condition or a complex condition.          Porfirio Soto MD    6/18/2025

## 2025-06-23 ENCOUNTER — TELEPHONE (OUTPATIENT)
Dept: INTERNAL MEDICINE | Facility: CLINIC | Age: 75
End: 2025-06-23
Payer: MEDICARE

## 2025-06-23 DIAGNOSIS — R92.8 ABNORMAL MAMMOGRAM: Primary | ICD-10-CM

## 2025-06-23 NOTE — TELEPHONE ENCOUNTER
[facility] is requesting script for this patient for left breast diagnostic mammogram and breast ultra sound if needed, Jennifer [phone], fax is [phone]

## 2025-08-05 ENCOUNTER — RESULTS FOLLOW-UP (OUTPATIENT)
Dept: INTERNAL MEDICINE | Facility: CLINIC | Age: 75
End: 2025-08-05
Payer: MEDICARE

## 2025-08-05 ENCOUNTER — APPOINTMENT (OUTPATIENT)
Dept: LAB | Facility: HOSPITAL | Age: 75
End: 2025-08-05
Attending: INTERNAL MEDICINE
Payer: MEDICARE

## 2025-08-05 DIAGNOSIS — E78.5 HYPERLIPIDEMIA, UNSPECIFIED HYPERLIPIDEMIA TYPE: ICD-10-CM

## 2025-08-05 DIAGNOSIS — E55.9 VITAMIN D DEFICIENCY: ICD-10-CM

## 2025-08-05 DIAGNOSIS — E11.9 TYPE 2 DIABETES MELLITUS WITHOUT COMPLICATION, WITHOUT LONG-TERM CURRENT USE OF INSULIN (CMS/HCC): ICD-10-CM

## 2025-08-05 LAB
25(OH)D3 SERPL-MCNC: 43 NG/ML (ref 30–100)
ALBUMIN SERPL-MCNC: 4.7 G/DL (ref 3.5–5.7)
ALP SERPL-CCNC: 59 IU/L (ref 34–125)
ALT SERPL-CCNC: 14 IU/L (ref 7–52)
ANION GAP SERPL CALC-SCNC: 9 MEQ/L (ref 3–15)
AST SERPL-CCNC: 19 IU/L (ref 13–39)
BASOPHILS # BLD: 0.06 K/UL (ref 0.01–0.1)
BASOPHILS NFR BLD: 0.8 %
BILIRUB SERPL-MCNC: 0.7 MG/DL (ref 0.3–1.2)
BUN SERPL-MCNC: 19 MG/DL (ref 7–25)
CALCIUM SERPL-MCNC: 10.2 MG/DL (ref 8.6–10.3)
CHLORIDE SERPL-SCNC: 100 MEQ/L (ref 98–107)
CHOLEST SERPL-MCNC: 177 MG/DL
CO2 SERPL-SCNC: 31 MEQ/L (ref 21–31)
CREAT SERPL-MCNC: 0.9 MG/DL (ref 0.6–1.2)
CREAT UR-MCNC: 141.5 MG/DL
DIFFERENTIAL METHOD BLD: ABNORMAL
EGFRCR SERPLBLD CKD-EPI 2021: >60 ML/MIN/1.73M*2
EOSINOPHIL # BLD: 0.18 K/UL (ref 0.04–0.36)
EOSINOPHIL NFR BLD: 2.3 %
ERYTHROCYTE [DISTWIDTH] IN BLOOD BY AUTOMATED COUNT: 12.7 % (ref 11.7–14.4)
EST. AVERAGE GLUCOSE BLD GHB EST-MCNC: 157 MG/DL
GLUCOSE SERPL-MCNC: 108 MG/DL (ref 70–99)
HBA1C MFR BLD: 7.1 %
HCT VFR BLD AUTO: 39.1 % (ref 35–45)
HDLC SERPL-MCNC: 56 MG/DL
HDLC SERPL: 3.2 {RATIO}
HGB BLD-MCNC: 13.2 G/DL (ref 11.8–15.7)
IMM GRANULOCYTES # BLD AUTO: 0.01 K/UL (ref 0–0.08)
IMM GRANULOCYTES NFR BLD AUTO: 0.1 %
LDLC SERPL CALC-MCNC: 78 MG/DL
LYMPHOCYTES # BLD: 2.99 K/UL (ref 1.2–3.5)
LYMPHOCYTES NFR BLD: 38.4 %
MCH RBC QN AUTO: 29.4 PG (ref 28–33.2)
MCHC RBC AUTO-ENTMCNC: 33.8 G/DL (ref 32.2–35.5)
MCV RBC AUTO: 87.1 FL (ref 83–98)
MICROALBUMIN UR-MCNC: 9.2 MG/L
MICROALBUMIN/CREAT UR: 6.5 UG/MG
MONOCYTES # BLD: 0.76 K/UL (ref 0.28–0.8)
MONOCYTES NFR BLD: 9.8 %
NEUTROPHILS # BLD: 3.79 K/UL (ref 1.7–7)
NEUTS SEG NFR BLD: 48.6 %
NONHDLC SERPL-MCNC: 121 MG/DL
NRBC BLD-RTO: 0 %
PLATELET # BLD AUTO: 356 K/UL (ref 150–369)
PMV BLD AUTO: 9.1 FL (ref 9.4–12.3)
POTASSIUM SERPL-SCNC: 4.5 MEQ/L (ref 3.5–5.1)
PROT SERPL-MCNC: 7 G/DL (ref 6–8.2)
RBC # BLD AUTO: 4.49 M/UL (ref 3.93–5.22)
SODIUM SERPL-SCNC: 140 MEQ/L (ref 136–145)
TRIGL SERPL-MCNC: 217 MG/DL
TSH SERPL DL<=0.05 MIU/L-ACNC: 1.7 MIU/L (ref 0.34–5.6)
WBC # BLD AUTO: 7.79 K/UL (ref 3.8–10.5)

## 2025-08-05 PROCEDURE — 82570 ASSAY OF URINE CREATININE: CPT

## 2025-08-05 PROCEDURE — 85025 COMPLETE CBC W/AUTO DIFF WBC: CPT

## 2025-08-05 PROCEDURE — 83036 HEMOGLOBIN GLYCOSYLATED A1C: CPT

## 2025-08-05 PROCEDURE — 80053 COMPREHEN METABOLIC PANEL: CPT

## 2025-08-05 PROCEDURE — 82306 VITAMIN D 25 HYDROXY: CPT

## 2025-08-05 PROCEDURE — 80061 LIPID PANEL: CPT

## 2025-08-05 PROCEDURE — 84443 ASSAY THYROID STIM HORMONE: CPT

## 2025-08-05 PROCEDURE — 36415 COLL VENOUS BLD VENIPUNCTURE: CPT
